# Patient Record
Sex: FEMALE | Race: WHITE | NOT HISPANIC OR LATINO | Employment: OTHER | URBAN - NONMETROPOLITAN AREA
[De-identification: names, ages, dates, MRNs, and addresses within clinical notes are randomized per-mention and may not be internally consistent; named-entity substitution may affect disease eponyms.]

---

## 2017-01-26 ENCOUNTER — RESULTS ENCOUNTER (OUTPATIENT)
Dept: ONCOLOGY | Facility: CLINIC | Age: 57
End: 2017-01-26

## 2017-01-26 ENCOUNTER — OFFICE VISIT (OUTPATIENT)
Dept: ONCOLOGY | Facility: CLINIC | Age: 57
End: 2017-01-26

## 2017-01-26 VITALS
WEIGHT: 177 LBS | BODY MASS INDEX: 28.45 KG/M2 | SYSTOLIC BLOOD PRESSURE: 169 MMHG | HEART RATE: 98 BPM | RESPIRATION RATE: 18 BRPM | DIASTOLIC BLOOD PRESSURE: 84 MMHG | HEIGHT: 66 IN | TEMPERATURE: 98.2 F

## 2017-01-26 DIAGNOSIS — T45.1X5A PERIPHERAL NEUROPATHY DUE TO CHEMOTHERAPY (HCC): Primary | ICD-10-CM

## 2017-01-26 DIAGNOSIS — R06.02 SHORTNESS OF BREATH ON EXERTION: ICD-10-CM

## 2017-01-26 DIAGNOSIS — G62.0 PERIPHERAL NEUROPATHY DUE TO CHEMOTHERAPY (HCC): ICD-10-CM

## 2017-01-26 DIAGNOSIS — D64.9 ANEMIA, UNSPECIFIED TYPE: ICD-10-CM

## 2017-01-26 DIAGNOSIS — G62.0 PERIPHERAL NEUROPATHY DUE TO CHEMOTHERAPY (HCC): Primary | ICD-10-CM

## 2017-01-26 DIAGNOSIS — T45.1X5A PERIPHERAL NEUROPATHY DUE TO CHEMOTHERAPY (HCC): ICD-10-CM

## 2017-01-26 PROCEDURE — 99214 OFFICE O/P EST MOD 30 MIN: CPT | Performed by: NURSE PRACTITIONER

## 2017-01-26 NOTE — MR AVS SNAPSHOT
Miguelina Hensley   2017 1:30 PM   Office Visit    Dept Phone:  [phone]   Encounter #:  [id number]    Provider:  FAVIOLA Red   Department:  [facility] GROUP HEMATOLOGY  AND ONCOLOGY                Your Full Care Plan              Your Updated Medication List          This list is accurate as of: 17  2:42 PM.  Always use your most recent med list.                anastrozole 1 MG tablet   Commonly known as:  ARIMIDEX       esomeprazole 20 MG capsule   Commonly known as:  nexIUM       gabapentin 300 MG capsule   Commonly known as:  NEURONTIN   Take 1 capsule by mouth 3 (Three) Times a Day. 1 nightly x 1 week, if tolerated increase to 1 twice daily x 1 week, then three times daily       ondansetron 4 MG tablet   Commonly known as:  ZOFRAN   Take 1 tablet by mouth Every 6 (Six) Hours As Needed for nausea or vomiting.       polyethylene glycol packet   Commonly known as:  MIRALAX   Take 17 g by mouth Daily.               We Performed the Following     Ambulatory Referral to Neurology     Ambulatory Referral to Pulmonology       Instructions     None    Patient Instructions History      [employer] Signup     [facility] [employer] allows you to send messages to your doctor, view your test results, renew your prescriptions, schedule appointments, and more. To sign up, go to [employer] and click on the Sign Up Now link in the New User? box. Enter your [employer] Activation Code exactly as it appears below along with the last four digits of your Social Security Number and your Date of Birth () to complete the sign-up process. If you do not sign up before the expiration date, you must request a new code.    [employer] Activation Code: [email or web address]  Expires: 2017  2:41 PM    If you have questions, you can email [email or web address] or call [phone] to talk to our [employer] staff. Remember, [employer] is NOT to be used for urgent needs. For  "medical emergencies, dial 911.               Other Info from Your Visit           Your appointments     Date & Time Provider Appointment Department    Feb 27, 2017 10:30 AM EST  (Arrive by 9:30 AM EST) Aroldo Rivera MD Outside Facility [facility] GASTROENTEROLOGY    Dec 18, 2017  1:00 PM EST FAVIOLA Brooke Pap Smear/Pelvic Exam [facility] GYNECOLOGIC ONCOLOGY        [facility] GASTROENTEROLOGY  [address]  [facility] [id number] [phone] [facility] GYNECOLOGIC ONCOLOGY  [address]  [facility] [id number] [phone]          Vital Signs     Blood Pressure Pulse Temperature Respirations Height Weight    169/84 98 98.2 °F (36.8 °C) 18 66\" (167.6 cm) 177 lb (80.3 kg)    Body Mass Index Smoking Status                28.57 kg/m2 Former Smoker          Problems and Diagnoses Noted     Anemia    Peripheral neuropathy due to chemotherapy    Shortness of breath on exertion        "

## 2017-01-26 NOTE — PROGRESS NOTES
CHIEF COMPLAINT: 1.  Shortness of breath with exertion                                       2.  Peripheral neuropathy of the hands and feet                                       3.  Follow-up for left breast cancer                                           Problem List:  Oncology/Hematology History    1. Breast cancer: Clinical stage [id number], stage IIIB, left breast cancer, invasive moderately differentiated ductal adenocarcinoma with apocrine features, Arteaga-Laurent grade II (3 + 3 + 1) estrogen receptor positive, progesterone receptor positive, HER-2/niraj negative. Presented with cutaneous involvement on 01/07/2015:   a) Staging baseline echocardiogram estimated ejection fraction 55% to 60% on  01/14/2016).   b) Staging total body bone scan revealed a small focus of increased tracer activity suggested within the left eleventh posterior rib versus artifact from tracer activity within the renal collecting system. No obvious abnormalities  identified within the left eleventh posterior rib on patient's recent CT examination. Underlying rib appears unremarkable. There no suspicious areas  otherwise to suggest metastatic disease.   c) CT of the head, chest, abdomen, and pelvis on 01/14/2016: CT of the head:  Negative. CT of the chest revealed a large mass within the left breast measuring 4.2 x 3.3 cm. The mass extends to the skin and there is mild skin thickening. Diffuse adenopathy within the left axilla. These nodes have a diameter up to 1.5 cm. There is adenopathy posterior to the left pectoralis major muscle with several nodes identified; there is a node between the left pectoralis major muscle and the pectoralis minor muscle that measures 3.0 x 1.9 cm. Several other additional nodes identified posterior to the left pectoralis major muscle. CT of the abdomen negative other than for low attenuation left adrenal gland, measuring approximately 1.2 cm likely secondary to underlying adrenal adenoma as well as within  the right adrenal gland. Also noted in the pelvis a 2.2 x 1.6 cm low attenuation structure likely felt to be ovarian follicle or cyst.   d) Baseline CBC and CMP, on 01/08/2016: WBC 9.2, hemoglobin 12.5, hematocrit  38.2%, platelets 358,000. CMP: Glucose 109, BUN 20, creatinine 0.97, sodium  140, potassium 4.4, chloride 99, CO2 of 23, calcium 9.8, protein 8.0, albumin  4.9, total bilirubin 0.5, alkaline phosphatase 94, AST 17, ALT 16.  e) BRCA 2 positive.  f) Follow-up bone scan on 06/06/2016 negative for metastatic disease. Repeat  CT of the abdomen and pelvis on 06/06/2016 revealed unchanged left adrenal  gland consistent with adenoma, 11 mm, no evidence of abdominal or pelvic  distant metastatic disease.   g) October found her dyspneic with O2sat of 83%.  on follow-up 1 week later her oxygen saturation on room air was 98% without any further interventions and she was feeling better despite no interventions.    2.  Peripheral Neuropathy          Malignant neoplasm of upper-outer quadrant of left female breast    1/7/2016 Initial Diagnosis    Malignant neoplasm of upper-outer quadrant of left female breast      1/21/2016 - 6/3/2016 Chemotherapy    DD Adriamycin and Cytoxan x 4 courses followed by Taxol weekly x 12 courses      2/2/2016 Genetic Testing    BRCA 2 Positive      6/27/2016 Surgery    Bilateral MRM's.  Right benign. Left 1.5 cm, up to 4 cm in scattered small lesions including into the deep dermis.  3 out of 13 lymph nodes positive.  Laurent 6 out of 9.  Estrogen and progesterone receptor previously positive HER-2/niraj negative.      7/7/2016 -  Hormonal Therapy    Arimidex      8/10/2016 - 9/21/2016 Radiation    Radiation OncologyTreatment Course:  Dr. Natanael Medellin      10/19/2016 Imaging    Workup of dyspnea included: CT angiogram of chest, negative, 2D ECHO normal with EF 60-65%, Pulmonary function tests with mild reduction in FVC on spirometry, suggestive of restrictive defect, otherwise normal.       11/1/2016 Surgery    Robotic-assisted total laparoscopic hysterectomy, bilateral salpingo-oophorectomy         HISTORY OF PRESENT ILLNESS:  The patient is a 56 y.o. female, here for follow up on management of history of left breast cancer.  The patient states that she continues to suffer from peripheral neuropathy and feels that it is worsening.  She states that her hands basically are numb and cramp all of the time, she has difficulty writing and holding objects.  She also reports that with the numbness in both of her feet she has difficulty walking at times and will stumble, she also states that the numbness and nerve discomfort is now moving from her feet up her legs to her knees.  She also continues to have significant shortness of breath with any exertion.  She currently has a nonproductive cough, she states that she has been getting over an upper respiratory illness.  She has been afebrile.  She also states she was recently diagnosed with a left frozen shoulder and is seeing orthopedics.  No other unusual bony aches or pains.  She has been scheduled for routine screening colonoscopy.      Past Medical History   Diagnosis Date   • Anemia    • Asthma    • BRCA2 positive    • Malignant neoplasm of upper outer quadrant of female breast      LEFT   • Menopause    • Migraines      HX   • Peripheral neuropathy      Past Surgical History   Procedure Laterality Date   • Carpal tunnel release     • Mastectomy Bilateral 2016   • Venous access device (port) insertion and removal Right    • Total laparoscopic hysterectomy N/A 11/1/2016     RATLH/BSO       Allergies   Allergen Reactions   • Ibuprofen Swelling       Family History and Social History reviewed and changed as necessary      REVIEW OF SYSTEM:   Review of Systems   Constitutional: Negative for appetite change, chills, diaphoresis, fatigue, fever and unexpected weight change.   HENT:   Negative for mouth sores, sore throat and trouble swallowing.    Eyes: Negative  "for icterus.   Respiratory: Negative for hemoptysis.    Cardiovascular: Negative for chest pain, leg swelling and palpitations.   Gastrointestinal: Negative for abdominal distention, abdominal pain, blood in stool, constipation, diarrhea, nausea and vomiting.   Endocrine: Negative for hot flashes.   Genitourinary: Negative for bladder incontinence, difficulty urinating, dysuria, frequency and hematuria.    Musculoskeletal: Negative for neck pain and neck stiffness.   Skin: Negative for rash.   Neurological: Negative for dizziness, headaches, light-headedness.   Hematological: Negative for adenopathy. Does not bruise/bleed easily.   Psychiatric/Behavioral: Negative for depression. The patient is not nervous/anxious.    All other systems reviewed and are negative except as stated above in history of present illness.       PHYSICAL EXAM    Vitals:    01/26/17 1314   BP: 169/84   Pulse: 98   Resp: 18   Temp: 98.2 °F (36.8 °C)   Weight: 177 lb (80.3 kg)   Height: 66\" (167.6 cm)     Constitutional: Appears well-developed and well-nourished. No distress.   ECOG: (1) Restricted in physically strenuous activity, ambulatory and able to do work of light nature  HENT:   Head: Normocephalic.   Mouth/Throat: Oropharynx is clear and moist.   Eyes: Conjunctivae are normal. Pupils are equal, round, and reactive. No scleral icterus.   Neck: Neck supple. No JVD present. No thyromegaly present.   Cardiovascular: Normal rate, regular rhythm and normal heart sounds.    Pulmonary/Chest: Breath sounds normal. No respiratory distress.   Abdominal: Soft. Exhibits no distension and no mass.   Musculoskeletal:Exhibits no edema or deformity.   Neurological: Alert and oriented to person, place, and time.   Skin: No ecchymosis, no petechiae and no rash noted. Not diaphoretic. No cyanosis. Nails show no clubbing.   Psychiatric: Normal mood and affect.   Vitals reviewed.    Diagnostic data: All previous office notes, outside physician notes and " laboratory data available for review in Epic were reviewed at time of visit today.    Assessment/Plan     1.  History of left breast cancer, stage IIIB, hormone receptor positive, HER-2/niraj negative: Patient continues on Arimidex without any significant side effects.  We will plan on continuing this through July 20 20/6/14 years of extended adjuvant therapy.  No evidence of recurrence on clinical exam however she still continues to suffer significantly from the side effects of her treatment.    2.  Peripheral neuropathy of the hands and feet after chemotherapy: The patient feels that this is worsening, she now states that she has difficulty walking and will stumble at times due to lack of feeling in her feet.  She also states that the nerve discomfort is moving up her calves.  She is on gabapentin 300 mg 3 times daily, I've asked her to titrate up to 600 mg 3 times daily.  I will also get her to neurology for further evaluation and recommendations.    3.  Shortness of breath with exertion: According to the patient, this does not seem to be improving but is persistent.  When I saw her in October she actually had hypoxemia in the office with an oxygen saturation down to 83% with ambulation.  Her oxygen saturation today was normal, it was 95-96% at rest and with ambulation although her heart rate did increase to 125 with ambulation.  Previous workup in October included pulmonary function test that showed some mild reduction in FVC on spirometry digestive of restrictive defect but otherwise normal, she had a negative CT angiogram, normal echocardiogram with ejection fraction of 60-65%, lab work was unremarkable.  I will refer her to pulmonology, Dr. Mishra for further evaluation.    4.  Anemia: Some of her dyspnea could be due to anemia but this seems out of proportion.  Most recent CBC after hysterectomy revealed a hemoglobin of 10, hematocrit 30.7% with MCV of 102.3.  The patient states that she has always been  somewhat anemic.  She is scheduled to undergo colonoscopy and is just waiting to get the date on that from her primary care provider.  In the meantime I will repeat her CBC today along with a CMP, we'll also check B12, folate, methylmalonic acid, ferritin and iron studies.    I will see her back in 3 months for survivorship follow-up and certainly sooner if needed.  25 minutes of today's 30 minute appointment was spent in counseling and education in regards to the above symptoms and their management.       Errors in dictation may reflect use of voice recognition software and not all errors in transcription may have been detected prior to signing.    Cherrie Stone, APRN    01/26/2017

## 2017-01-30 ENCOUNTER — DOCUMENTATION (OUTPATIENT)
Dept: ONCOLOGY | Facility: CLINIC | Age: 57
End: 2017-01-30

## 2017-02-15 ENCOUNTER — DOCUMENTATION (OUTPATIENT)
Dept: ONCOLOGY | Facility: CLINIC | Age: 57
End: 2017-02-15

## 2017-03-17 ENCOUNTER — LAB (OUTPATIENT)
Dept: LAB | Facility: HOSPITAL | Age: 57
End: 2017-03-17

## 2017-03-17 ENCOUNTER — OFFICE VISIT (OUTPATIENT)
Dept: NEUROLOGY | Facility: CLINIC | Age: 57
End: 2017-03-17

## 2017-03-17 VITALS
WEIGHT: 186 LBS | BODY MASS INDEX: 29.19 KG/M2 | HEIGHT: 67 IN | SYSTOLIC BLOOD PRESSURE: 122 MMHG | DIASTOLIC BLOOD PRESSURE: 86 MMHG

## 2017-03-17 DIAGNOSIS — G62.0 PERIPHERAL NEUROPATHY DUE TO CHEMOTHERAPY (HCC): Primary | ICD-10-CM

## 2017-03-17 DIAGNOSIS — T45.1X5A PERIPHERAL NEUROPATHY DUE TO CHEMOTHERAPY (HCC): Primary | ICD-10-CM

## 2017-03-17 DIAGNOSIS — D64.9 ANEMIA, UNSPECIFIED: Primary | ICD-10-CM

## 2017-03-17 LAB
HBA1C MFR BLD: 5.9 % (ref 4.8–5.6)
TSH SERPL DL<=0.05 MIU/L-ACNC: 24.16 MIU/ML (ref 0.35–5.35)
VIT B12 BLD-MCNC: 405 PG/ML (ref 211–911)

## 2017-03-17 PROCEDURE — 86334 IMMUNOFIX E-PHORESIS SERUM: CPT | Performed by: PSYCHIATRY & NEUROLOGY

## 2017-03-17 PROCEDURE — 83036 HEMOGLOBIN GLYCOSYLATED A1C: CPT | Performed by: PSYCHIATRY & NEUROLOGY

## 2017-03-17 PROCEDURE — 99244 OFF/OP CNSLTJ NEW/EST MOD 40: CPT | Performed by: PSYCHIATRY & NEUROLOGY

## 2017-03-17 PROCEDURE — 86038 ANTINUCLEAR ANTIBODIES: CPT | Performed by: PSYCHIATRY & NEUROLOGY

## 2017-03-17 PROCEDURE — 82607 VITAMIN B-12: CPT | Performed by: PSYCHIATRY & NEUROLOGY

## 2017-03-17 PROCEDURE — 36415 COLL VENOUS BLD VENIPUNCTURE: CPT | Performed by: PSYCHIATRY & NEUROLOGY

## 2017-03-17 PROCEDURE — 84443 ASSAY THYROID STIM HORMONE: CPT | Performed by: PSYCHIATRY & NEUROLOGY

## 2017-03-17 RX ORDER — BENZONATATE 200 MG/1
CAPSULE ORAL
COMMUNITY
Start: 2017-01-17 | End: 2017-10-13

## 2017-03-17 RX ORDER — PREDNISONE 50 MG/1
TABLET ORAL
COMMUNITY
Start: 2017-01-17 | End: 2017-10-13

## 2017-03-17 RX ORDER — FLUTICASONE PROPIONATE AND SALMETEROL XINAFOATE 115; 21 UG/1; UG/1
AEROSOL, METERED RESPIRATORY (INHALATION)
COMMUNITY
Start: 2017-03-10 | End: 2017-10-13

## 2017-03-17 RX ORDER — TIOTROPIUM BROMIDE INHALATION SPRAY 3.12 UG/1
SPRAY, METERED RESPIRATORY (INHALATION)
COMMUNITY
Start: 2017-03-09

## 2017-03-17 RX ORDER — AZITHROMYCIN 250 MG/1
TABLET, FILM COATED ORAL
COMMUNITY
Start: 2017-01-17 | End: 2017-10-13

## 2017-03-17 RX ORDER — GABAPENTIN 800 MG/1
TABLET ORAL
Qty: 90 TABLET | Refills: 3 | Status: SHIPPED | OUTPATIENT
Start: 2017-03-17 | End: 2017-10-13

## 2017-03-17 NOTE — PROGRESS NOTES
"Subjective   Miguelina Hensley is a 56 y.o. female.     History of Present Illness     The following portions of the patient's history were reviewed today and updated as appropriate:  allergies, current medications, past family history, past medical history, past social history, past surgical history and problem list.      Chief complaint is pain, numbness and the patient is seen today in consultation at the request of the referring health care provider  The time I spent with the patient today was used discussing the differential diagnosis, treatment and management options and prognosis. I addressed all of the patient's questions.  The patient has had after chemotherapy from Taxol nerve damage in both feet and also partially in her hands. However that problem has progressed now that she is stumbling and she also has more cramping in her feet and significant pain. Because of the worsening of her problem she is now seen here.    Review of Systems   Constitutional: Negative for appetite change, chills and fatigue.   HENT: Negative for congestion, ear pain, facial swelling and sinus pressure.    Eyes: Negative for pain and redness.   Respiratory: Negative for shortness of breath.    Cardiovascular: Negative for chest pain.   Gastrointestinal: Negative for abdominal pain.   Endocrine: Negative for cold intolerance and heat intolerance.   Genitourinary: Negative for dysuria.   Musculoskeletal: Negative for arthralgias.   Skin: Negative for rash.   Allergic/Immunologic: Negative for immunocompromised state.   Neurological: Positive for numbness.   Hematological: Negative for adenopathy.   Psychiatric/Behavioral: Negative for hallucinations.         Objective      height is 67\" (170.2 cm) and weight is 186 lb (84.4 kg). Her blood pressure is 122/86.     The patient's general appearance was normal today  Carotid pulses were palpable bilaterally  The ophthalmological exam showed the refractory media clear, no blurring of disc " margins, there were no hemorrhages noted, blood vessels appeared normal.      Neurologic Exam     Mental Status   Oriented to person.   Oriented to place. Oriented to country, city, area and street.   Oriented to time. Oriented to year, month, date, day and season.   Registration: recalls 3 of 3 objects. Recall at 5 minutes: recalls 3 of 3 objects. Follows 3 step commands.   Attention: normal.   Speech: speech is normal   Level of consciousness: alert  Knowledge: consistent with education.   Able to name object. Able to read. Able to repeat. Able to write. Normal comprehension.     Cranial Nerves     CN II   Visual fields full to confrontation.     CN III, IV, VI   Right pupil: Shape: regular. Consensual response: intact. Accommodation: intact.   Left pupil: Shape: regular. Consensual response: intact.   CN III: no CN III palsy  CN VI: no CN VI palsy  Nystagmus: none   Diplopia: none  Ophthalmoparesis: none  Upgaze: normal  Downgaze: normal  Conjugate gaze: present  Vestibulo-ocular reflex: present    CN V   Facial sensation intact.     CN VII   Facial expression full, symmetric.     CN VIII   CN VIII normal.     CN IX, X   CN IX normal.     CN XI   CN XI normal.     CN XII   CN XII normal.     Motor Exam   Muscle bulk: normal  Overall muscle tone: normal  Right arm pronator drift: absent  Left arm pronator drift: absent    Strength   Strength 5/5 except as noted.     Sensory Exam   Light touch normal.   Right leg vibration: decreased from toes  Left leg vibration: decreased from toes    Gait, Coordination, and Reflexes     Gait  Gait: normal    Coordination   Romberg: negative  Finger to nose coordination: normal  Heel to shin coordination: normal  Tandem walking coordination: normal    Tremor   Resting tremor: absent  Intention tremor: absent  Action tremor: absent    Reflexes   Reflexes 2+ except as noted.   Right achilles: 0  Left achilles: 0      Assessment/Plan     Miguelina was seen today for peripheral  "neuropathy.    Diagnoses and all orders for this visit:    Peripheral neuropathy due to chemotherapy  -     Hemoglobin A1c  -     TSH  -     Vitamin B12  -     Immunofixation Serum  -     NYA With / DsDNA, RNP, Sjogrens A / B, Smith  -     EMG & Nerve Conduction Test    Other orders  -     gabapentin (NEURONTIN) 800 MG tablet; 1 to 1 1/2 tab po TID          Discussion/Summary:        Will have to have some additional blood work to see if she has other additional causes in addition to the chemotherapy. She will have to have EMG nerve conduction studies in the meantime increase her gabapentin dose. I will discuss further management options with her when I see her back            Disclaimer: This letter was created using dragon voice recognition software.  This voice recognition software may create errors that may go undetected and can impact the meaning of a sentence or paragraph.  The most frequent error is that the software misidentifies \"he\" and \"she\". However, contextual reading is often able to identify this as a voice recognotion error.  Another frequent error is that the software misidentifies \"the patient\" as \"\"          "

## 2017-03-20 LAB
ANA SER QL: NEGATIVE
IGA SERPL-MCNC: 158 MG/DL (ref 87–352)
IGG SERPL-MCNC: 1053 MG/DL (ref 700–1600)
IGM SERPL-MCNC: 128 MG/DL (ref 26–217)
PROT PATTERN SERPL IFE-IMP: NORMAL

## 2017-04-13 ENCOUNTER — CLINICAL SUPPORT (OUTPATIENT)
Dept: ONCOLOGY | Facility: CLINIC | Age: 57
End: 2017-04-13

## 2017-04-13 VITALS
WEIGHT: 190 LBS | TEMPERATURE: 98.8 F | BODY MASS INDEX: 29.82 KG/M2 | HEART RATE: 100 BPM | DIASTOLIC BLOOD PRESSURE: 74 MMHG | HEIGHT: 67 IN | RESPIRATION RATE: 16 BRPM | SYSTOLIC BLOOD PRESSURE: 105 MMHG

## 2017-04-13 DIAGNOSIS — Z15.01 BRCA2 POSITIVE: Primary | ICD-10-CM

## 2017-04-13 DIAGNOSIS — Z85.3 HISTORY OF LEFT BREAST CANCER: ICD-10-CM

## 2017-04-13 DIAGNOSIS — Z15.09 BRCA2 POSITIVE: Primary | ICD-10-CM

## 2017-04-13 PROCEDURE — 99214 OFFICE O/P EST MOD 30 MIN: CPT | Performed by: NURSE PRACTITIONER

## 2017-04-13 NOTE — PROGRESS NOTES
CHIEF COMPLAINT:   1.  Peripheral Neuropathy                                         2.  Follow up for history of breast cancer    Problem List:  Oncology/Hematology History    1. Breast cancer: Clinical stage [id number], stage IIIB, left breast cancer, invasive moderately differentiated ductal adenocarcinoma with apocrine features, Arteaga-Laurent grade II (3 + 3 + 1) estrogen receptor positive, progesterone receptor positive, HER-2/niraj negative. Presented with cutaneous involvement on 01/07/2015:   a) Staging baseline echocardiogram estimated ejection fraction 55% to 60% on  01/14/2016).   b) Staging total body bone scan revealed a small focus of increased tracer activity suggested within the left eleventh posterior rib versus artifact from tracer activity within the renal collecting system. No obvious abnormalities  identified within the left eleventh posterior rib on patient's recent CT examination. Underlying rib appears unremarkable. There no suspicious areas  otherwise to suggest metastatic disease.   c) CT of the head, chest, abdomen, and pelvis on 01/14/2016: CT of the head:  Negative. CT of the chest revealed a large mass within the left breast measuring 4.2 x 3.3 cm. The mass extends to the skin and there is mild skin thickening. Diffuse adenopathy within the left axilla. These nodes have a diameter up to 1.5 cm. There is adenopathy posterior to the left pectoralis major muscle with several nodes identified; there is a node between the left pectoralis major muscle and the pectoralis minor muscle that measures 3.0 x 1.9 cm. Several other additional nodes identified posterior to the left pectoralis major muscle. CT of the abdomen negative other than for low attenuation left adrenal gland, measuring approximately 1.2 cm likely secondary to underlying adrenal adenoma as well as within the right adrenal gland. Also noted in the pelvis a 2.2 x 1.6 cm low attenuation structure likely felt to be ovarian follicle or  cyst.   d) Baseline CBC and CMP, on 01/08/2016: WBC 9.2, hemoglobin 12.5, hematocrit  38.2%, platelets 358,000. CMP: Glucose 109, BUN 20, creatinine 0.97, sodium  140, potassium 4.4, chloride 99, CO2 of 23, calcium 9.8, protein 8.0, albumin  4.9, total bilirubin 0.5, alkaline phosphatase 94, AST 17, ALT 16.  e) BRCA 2 positive.  f) Follow-up bone scan on 06/06/2016 negative for metastatic disease. Repeat  CT of the abdomen and pelvis on 06/06/2016 revealed unchanged left adrenal  gland consistent with adenoma, 11 mm, no evidence of abdominal or pelvic  distant metastatic disease.   g) October found her dyspneic with O2sat of 83%.  on follow-up 1 week later her oxygen saturation on room air was 98% without any further interventions and she was feeling better despite no interventions.    2.  Peripheral Neuropathy          Malignant neoplasm of upper-outer quadrant of left female breast    1/7/2016 Initial Diagnosis    Malignant neoplasm of upper-outer quadrant of left female breast      1/21/2016 - 6/3/2016 Chemotherapy    DD Adriamycin and Cytoxan x 4 courses followed by Taxol weekly x 12 courses      2/2/2016 Genetic Testing    BRCA 2 Positive      6/27/2016 Surgery    Bilateral MRM's.  Right benign. Left 1.5 cm, up to 4 cm in scattered small lesions including into the deep dermis.  3 out of 13 lymph nodes positive.  Laurent 6 out of 9.  Estrogen and progesterone receptor previously positive HER-2/niraj negative.      7/7/2016 -  Hormonal Therapy    Arimidex      8/10/2016 - 9/21/2016 Radiation    Radiation OncologyTreatment Course:  Dr. Natanael Medellin      10/19/2016 Imaging    Workup of dyspnea included: CT angiogram of chest, negative, 2D ECHO normal with EF 60-65%, Pulmonary function tests with mild reduction in FVC on spirometry, suggestive of restrictive defect, otherwise normal.      11/1/2016 Surgery    Robotic-assisted total laparoscopic hysterectomy, bilateral salpingo-oophorectomy         HISTORY OF  PRESENT ILLNESS:  The patient is a 56 y.o. female, here for follow up on management of history of left breast cancer.  The patient continues to have difficulty with her peripheral neuropathy in her hands and feet.  It is not worsening but is persistent in nature and quite bothersome to her.  She is seeing neurology.  She did have lab work done and states she received a letter from Dr. Rod to let her know that her thyroid function test was abnormal along with her hemoglobin A1c.  She also continues to have some shortness of breath with exertion, she is following with Dr. Mishra and states that this may be somewhat improved on some of her new medication.  She has had no unusual cough or hemoptysis.  She reports some mild hoarseness that she thinks is due to some of the medication.  No new or concerning bony aches or pains, no new or concerning findings on chest wall exam.  Continues to tolerate Arimidex without any unusual side effects.      Past Medical History:   Diagnosis Date   • Anemia    • Asthma    • BRCA2 positive    • Malignant neoplasm of upper outer quadrant of female breast     LEFT   • Menopause    • Migraines     HX   • Peripheral neuropathy      Past Surgical History:   Procedure Laterality Date   • CARPAL TUNNEL RELEASE     • MASTECTOMY Bilateral 2016   • TOTAL LAPAROSCOPIC HYSTERECTOMY N/A 11/1/2016    RATLH/BSO   • VENOUS ACCESS DEVICE (PORT) INSERTION AND REMOVAL Right        Allergies   Allergen Reactions   • Ibuprofen Swelling       Family History and Social History reviewed and changed as necessary      REVIEW OF SYSTEM:   Review of Systems   Constitutional: Negative for appetite change, chills, diaphoresis, fever and unexpected weight change.   HENT:   Negative for mouth sores, sore throat and trouble swallowing.    Eyes: Negative for icterus.   Respiratory: Negative for cough, hemoptysis.    Cardiovascular: Negative for chest pain, leg swelling and palpitations.   Gastrointestinal:  "Negative for abdominal distention, abdominal pain, blood in stool, constipation, diarrhea, nausea and vomiting.   Endocrine: Negative for hot flashes.   Genitourinary: Negative for bladder incontinence, difficulty urinating, dysuria, frequency and hematuria.    Musculoskeletal: Negative for gait problem, neck pain and neck stiffness.   Skin: Negative for rash.   Neurological: Negative for dizziness, headaches, light-headedness.   Hematological: Negative for adenopathy. Does not bruise/bleed easily.   Psychiatric/Behavioral: Negative for depression. The patient is not nervous/anxious.    All other systems reviewed and are negative except as stated above in the history of present illness.       PHYSICAL EXAM    Vitals:    04/13/17 1324   BP: 105/74   Pulse: 100   Resp: 16   Temp: 98.8 °F (37.1 °C)   Weight: 190 lb (86.2 kg)   Height: 67\" (170.2 cm)     Constitutional: Appears well-developed and well-nourished. No distress.   ECOG: (1) Restricted in physically strenuous activity, ambulatory and able to do work of light nature  HENT:   Head: Normocephalic.   Mouth/Throat: Oropharynx is clear and moist.   Eyes: Conjunctivae are normal. Pupils are equal, round, and reactive. No scleral icterus.   Neck: Neck supple. No JVD present. No thyromegaly present.   Cardiovascular: Normal rate, regular rhythm and normal heart sounds.  Chest: Chest wall exam status post bilateral mastectomies is benign, skin tissue is soft, there are no abnormal masses, nodules or lesions.   Nodes: No cervical, supraclavicular, axillary or inguinal nodes palpable on exam.    Pulmonary/Chest: Breath sounds normal. No respiratory distress.   Abdominal: Soft. Exhibits no distension.   Musculoskeletal:Exhibits no edema, tenderness or deformity.   Neurological: Alert and oriented to person, place, and time. Exhibits normal muscle tone.   Skin: No ecchymosis, no petechiae and no rash noted. Not diaphoretic. No cyanosis. Nails show no clubbing. "   Psychiatric: Normal mood and affect.   Vitals reviewed.    Diagnostic data: All previous office notes, outside physician notes including notes from pulmonology and neurology along with laboratory data were reviewed at time of visit.  Patient's survivorship care plan was also reviewed with her at time of visit.    Assessment/Plan     1.  History of left breast cancer, stage IIIB, hormone receptor positive, HER-2/niraj negative: The patient continues to be without any evidence of disease on clinical exam and has no new worrisome symptoms.  She continues on Arimidex of which we plan on 10 years with extended adjuvant therapy through July 2026.  We will plan to see her in 6 months for follow-up and chest wall exam.  At that time, if she has not had bone density testing we will arrange for that.  I did review her survivorship care plan with her at today's appointment, all questions were answered to her satisfaction and she was provided a copy of the report.    2.  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 Rod and will continue to follow with him.  Recent lab work from his office revealed an elevated TSH and a slightly elevated hemoglobin A1c.  I have asked Miguelina to follow-up with Dr. Gonzalez in regards to these findings, she states understanding.    3.  Shortness of breath with exertion: She has seen Dr. Mishra and will continue to follow with him.  She states this may be slightly better with some new medication she is now taking.    20 minutes of today's 25 minute appointment was spent in counseling and education in regards to the above.   Errors in dictation may reflect use of voice recognition software and not all errors in transcription may have been detected prior to signing.    Cherrie Stone, APRN    04/13/2017

## 2017-04-27 ENCOUNTER — OFFICE VISIT (OUTPATIENT)
Dept: NEUROLOGY | Facility: CLINIC | Age: 57
End: 2017-04-27

## 2017-04-27 DIAGNOSIS — R20.0 NUMBNESS AND TINGLING: Primary | ICD-10-CM

## 2017-04-27 DIAGNOSIS — R20.2 NUMBNESS AND TINGLING: Primary | ICD-10-CM

## 2017-04-27 PROCEDURE — 95886 MUSC TEST DONE W/N TEST COMP: CPT | Performed by: PSYCHIATRY & NEUROLOGY

## 2017-04-27 PROCEDURE — 95911 NRV CNDJ TEST 9-10 STUDIES: CPT | Performed by: PSYCHIATRY & NEUROLOGY

## 2017-04-27 NOTE — PROGRESS NOTES
"Procedure   EMG & Nerve Conduction Test  Date/Time: 4/27/2017 9:31 AM  Performed by: VENKATESH BOSCH  Authorized by: VENKATESH BOSCH   Consent: Verbal consent obtained.            [facility]   [address], [address]  [place]  [address]   Phone: [phone]  FAX: [phone]           Patient: catherine cabrales YOB: 1960  Gender: Female  Reason for study: see below in history section      Visit Date: 4/27/2017 09:36  Age: 56 Years 11 Months Old  Examining Physician: Pablito       The patient has a chief complaint and history of bilateral, upper extremity, lower extremity, numbness,    The patient is referred to have this problem evaluated today with EMG and nerve conduction studies.  The performing physician also acted as a technician on this study.  Please note that in the table \"NR\" stands for \"no response\" therefore testing was performed, but no response was elicited.    A brief neurological exam, revealed no abnormalities in muscle bulk strength reflexes and sensationbilateral, upper extremity, lower extremity, numbness,      Sensory NCS      Nerve / Sites Rec. Site Distance Onset Lat NP Amp Onset Mike     cm ms µV m/s   L Sural - Ankle (Calf)      Calf Ankle 14 4.8 0.5 29   R Sural - Ankle (Calf)      Calf Ankle 14 4.6 0.8 30   L Superficial peroneal - Ankle      Lat leg Ankle 8 4.3 0.3 19       Motor NCS      Nerve / Sites Muscle Distance Latency Amplitude Velocity     cm ms mV m/s   L Peroneal - EDB      Ankle EDB 8 5.47 2.7       Fib head EDB 30 13.02 2.7 40   R Peroneal - EDB      Ankle EDB 8 4.84 7.7       Fib head EDB 30 12.34 8.0 40   L Tibial - AH      Ankle AH 8 6.82 7.4       Pop fossa AH 43 17.55 7.7 40   R Tibial - AH      Ankle AH 8 6.15 9.7       Pop fossa AH 43 14.43 9.7 52       F  Wave      Nerve F-min    ms   L Tibial - AH 47   L Peroneal - EDB 49   R Peroneal - EDB 52   R Tibial - AH 55       H Reflex      Nerve H Lat    ms   L Tibial - Soleus " 42.9   R Tibial - Soleus 48.6       EMG Summary Table     Spontaneous MUAP Recruitment    IA Fib PSW Fasc H.F. Amp Dur. PPP Pattern   L. GASTROCN (MED) N None None None None N N N N   L. TIB ANTERIOR N None None None None N N N N   L. ILIOPSOAS N None None None None N N N N   L. QUADRICEPS N None None None None N N N N   L. GLUTEUS MAX N None None None None N N N N   L. L.PARASPINALS N None None None None N N N N   R. L.PARASPINALS N None None None None N N N N   R. GASTROCN (MED) N None None None None N N N N   R. TIB ANTERIOR N None None None None N N N N   R. ILIOPSOAS N None None None None N N N N   R. QUADRICEPS N None None None None N N N N   R. GLUTEUS MAX N None None None None N N N N   1.) SNAPs were mildly slow.  2.) CMAPs were normal  3.) F-waves were of normal minimal latency where elicitable.  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    All NCS were performed at 32C or greater.        Matheus Kenney M.D.                       Diagnoses and all orders for this visit:    Numbness and tingling  -     EMG & Nerve Conduction Test

## 2017-06-23 ENCOUNTER — DOCUMENTATION (OUTPATIENT)
Dept: ONCOLOGY | Facility: CLINIC | Age: 57
End: 2017-06-23

## 2017-06-23 NOTE — PROGRESS NOTES
6/23/2017  Rec'd unum letter requesting records from 1/27/2017 to present.  SANCHO-2/28/2017  LOV-1/26/2017  Signed and dated form and put LOV.  Faxed to #[phone]

## 2017-07-28 ENCOUNTER — TELEPHONE (OUTPATIENT)
Dept: NEUROLOGY | Facility: CLINIC | Age: 57
End: 2017-07-28

## 2017-07-28 NOTE — TELEPHONE ENCOUNTER
----- Message from Neha Orozco sent at 7/28/2017 10:56 AM EDT -----  Contact: ROBERT BOSCH    ROBERT CALLED AND SAID THAT HER INSURANCE WILL NOT COVER HER GABAPENTIN AND IT'S FAR TOO EXPENSIVE NOW.  SHE WONDERED IF THERE WAS A DIFFERENT MEDICATION, THAT WAS CHEAPER, THAT SHE COULD TRY?    PLEASE CALL ROBERT BACK  [phone]  ______________________________________________________    I LVM. Advised pt to contact her [employer] company. This is not something I would know. I provided my name and CB number.   -TMT 7/28/17

## 2017-08-11 RX ORDER — ANASTROZOLE 1 MG/1
TABLET ORAL
Qty: 30 TABLET | Refills: 11 | Status: SHIPPED | OUTPATIENT
Start: 2017-08-11 | End: 2017-08-18 | Stop reason: SDUPTHER

## 2017-08-18 RX ORDER — ANASTROZOLE 1 MG/1
1 TABLET ORAL DAILY
Qty: 90 TABLET | Refills: 3 | Status: SHIPPED | OUTPATIENT
Start: 2017-08-18 | End: 2018-09-11 | Stop reason: SDUPTHER

## 2017-10-13 ENCOUNTER — OFFICE VISIT (OUTPATIENT)
Dept: ONCOLOGY | Facility: CLINIC | Age: 57
End: 2017-10-13

## 2017-10-13 VITALS
RESPIRATION RATE: 16 BRPM | TEMPERATURE: 97.8 F | SYSTOLIC BLOOD PRESSURE: 136 MMHG | BODY MASS INDEX: 30.61 KG/M2 | HEIGHT: 67 IN | HEART RATE: 86 BPM | DIASTOLIC BLOOD PRESSURE: 73 MMHG | WEIGHT: 195 LBS

## 2017-10-13 DIAGNOSIS — G62.0 PERIPHERAL NEUROPATHY DUE TO CHEMOTHERAPY (HCC): Primary | ICD-10-CM

## 2017-10-13 DIAGNOSIS — T45.1X5A PERIPHERAL NEUROPATHY DUE TO CHEMOTHERAPY (HCC): Primary | ICD-10-CM

## 2017-10-13 DIAGNOSIS — Z85.3 HISTORY OF LEFT BREAST CANCER: ICD-10-CM

## 2017-10-13 PROCEDURE — 99214 OFFICE O/P EST MOD 30 MIN: CPT | Performed by: NURSE PRACTITIONER

## 2017-10-13 RX ORDER — LEVOTHYROXINE SODIUM 0.12 MG/1
TABLET ORAL
COMMUNITY
Start: 2017-09-13

## 2017-10-13 RX ORDER — GABAPENTIN 400 MG/1
800 CAPSULE ORAL 3 TIMES DAILY
Qty: 180 CAPSULE | Refills: 0 | Status: SHIPPED | OUTPATIENT
Start: 2017-10-13

## 2017-10-13 RX ORDER — OMEPRAZOLE 40 MG/1
CAPSULE, DELAYED RELEASE ORAL
COMMUNITY
Start: 2017-09-20

## 2017-10-13 NOTE — PROGRESS NOTES
CHIEF COMPLAINT:   1.  Peripheral Neuropathy                                         2.  Follow up for history of breast cancer    Problem List:  Oncology/Hematology History    1. Breast cancer: Clinical stage [id number], stage IIIB, left breast cancer, invasive moderately differentiated ductal adenocarcinoma with apocrine features, Arteaga-Laurent grade II (3 + 3 + 1) estrogen receptor positive, progesterone receptor positive, HER-2/niraj negative. Presented with cutaneous involvement on 01/07/2015:   a) Staging baseline echocardiogram estimated ejection fraction 55% to 60% on  01/14/2016).   b) Staging total body bone scan revealed a small focus of increased tracer activity suggested within the left eleventh posterior rib versus artifact from tracer activity within the renal collecting system. No obvious abnormalities  identified within the left eleventh posterior rib on patient's recent CT examination. Underlying rib appears unremarkable. There no suspicious areas  otherwise to suggest metastatic disease.   c) CT of the head, chest, abdomen, and pelvis on 01/14/2016: CT of the head:  Negative. CT of the chest revealed a large mass within the left breast measuring 4.2 x 3.3 cm. The mass extends to the skin and there is mild skin thickening. Diffuse adenopathy within the left axilla. These nodes have a diameter up to 1.5 cm. There is adenopathy posterior to the left pectoralis major muscle with several nodes identified; there is a node between the left pectoralis major muscle and the pectoralis minor muscle that measures 3.0 x 1.9 cm. Several other additional nodes identified posterior to the left pectoralis major muscle. CT of the abdomen negative other than for low attenuation left adrenal gland, measuring approximately 1.2 cm likely secondary to underlying adrenal adenoma as well as within the right adrenal gland. Also noted in the pelvis a 2.2 x 1.6 cm low attenuation structure likely felt to be ovarian follicle or  cyst.   d) Baseline CBC and CMP, on 01/08/2016: WBC 9.2, hemoglobin 12.5, hematocrit  38.2%, platelets 358,000. CMP: Glucose 109, BUN 20, creatinine 0.97, sodium  140, potassium 4.4, chloride 99, CO2 of 23, calcium 9.8, protein 8.0, albumin  4.9, total bilirubin 0.5, alkaline phosphatase 94, AST 17, ALT 16.  e) BRCA 2 positive.  f) Follow-up bone scan on 06/06/2016 negative for metastatic disease. Repeat  CT of the abdomen and pelvis on 06/06/2016 revealed unchanged left adrenal  gland consistent with adenoma, 11 mm, no evidence of abdominal or pelvic  distant metastatic disease.   g) October found her dyspneic with O2sat of 83%.  on follow-up 1 week later her oxygen saturation on room air was 98% without any further interventions and she was feeling better despite no interventions.    2.  Peripheral Neuropathy          Malignant neoplasm of upper-outer quadrant of left female breast    1/7/2016 Initial Diagnosis     Malignant neoplasm of upper-outer quadrant of left female breast         1/21/2016 - 6/3/2016 Chemotherapy     DD Adriamycin and Cytoxan x 4 courses followed by Taxol weekly x 12 courses         2/2/2016 Genetic Testing     BRCA 2 Positive         6/27/2016 Surgery     Bilateral MRM's.  Right benign. Left 1.5 cm, up to 4 cm in scattered small lesions including into the deep dermis.  3 out of 13 lymph nodes positive.  Laurent 6 out of 9.  Estrogen and progesterone receptor previously positive HER-2/niraj negative.         7/7/2016 -  Hormonal Therapy     Arimidex         8/10/2016 - 9/21/2016 Radiation     Radiation OncologyTreatment Course:  Dr. Natanael Medellin         10/19/2016 Imaging     Workup of dyspnea included: CT angiogram of chest, negative, 2D ECHO normal with EF 60-65%, Pulmonary function tests with mild reduction in FVC on spirometry, suggestive of restrictive defect, otherwise normal.         11/1/2016 Surgery     Robotic-assisted total laparoscopic hysterectomy, bilateral  "salpingo-oophorectomy            HISTORY OF PRESENT ILLNESS:  The patient is a 57 y.o. female, here for follow up on management of history of left breast cancer.  The patient continues to have difficulty with her peripheral neuropathy in her hands and feet.  It is not worsening but is persistent in nature and quite bothersome to her.  She has seen neurology and states they told her that basically \"it won't get any better\".  Her Neurontin was increased to 800 mg 3 times a day however she cannot afford the 800 mg tablets.  She is following with Dr. Mishra in regards to her shortness of breath and states that it has improved on medications but she cannot afford the Spiriva.  No new or concerning bony aches or pains, no new or concerning findings on chest wall exam.  Continues to tolerate Arimidex without any unusual side effects.  Has seen both Dr. Bryan and Dr. Luis in regards to her decreased range of motion of her left shoulder but states she is not a surgical candidate.      Past Medical History:   Diagnosis Date   • Anemia    • Asthma    • BRCA2 positive    • Malignant neoplasm of upper outer quadrant of female breast     LEFT   • Menopause    • Migraines     HX   • Peripheral neuropathy      Past Surgical History:   Procedure Laterality Date   • CARPAL TUNNEL RELEASE     • MASTECTOMY Bilateral 2016   • TOTAL LAPAROSCOPIC HYSTERECTOMY N/A 11/1/2016    RATLH/BSO   • VENOUS ACCESS DEVICE (PORT) INSERTION AND REMOVAL Right        Allergies   Allergen Reactions   • Ibuprofen Swelling       Family History and Social History reviewed and changed as necessary      REVIEW OF SYSTEM:   Review of Systems   Constitutional: Negative for appetite change, chills, diaphoresis, fever and unexpected weight change.   HENT:   Negative for mouth sores, sore throat and trouble swallowing.    Eyes: Negative for icterus.   Respiratory: Negative for cough, hemoptysis.    Cardiovascular: Negative for chest pain, leg swelling and " "palpitations.   Gastrointestinal: Negative for abdominal distention, abdominal pain, blood in stool, constipation, diarrhea, nausea and vomiting.   Endocrine: Negative for hot flashes.   Genitourinary: Negative for bladder incontinence, difficulty urinating, dysuria, frequency and hematuria.    Musculoskeletal: Negative for gait problem, neck pain and neck stiffness.   Skin: Negative for rash.   Neurological: Negative for dizziness, headaches, light-headedness.   Hematological: Negative for adenopathy. Does not bruise/bleed easily.   Psychiatric/Behavioral: Negative for depression. The patient is not nervous/anxious.    All other systems reviewed and are negative except as stated above in the history of present illness.       PHYSICAL EXAM    Vitals:    10/13/17 1123   BP: 136/73   Pulse: 86   Resp: 16   Temp: 97.8 °F (36.6 °C)   Weight: 195 lb (88.5 kg)   Height: 67\" (170.2 cm)     Constitutional: Appears well-developed and well-nourished. No distress.   ECOG: (1) Restricted in physically strenuous activity, ambulatory and able to do work of light nature  HENT:   Head: Normocephalic.   Mouth/Throat: Oropharynx is clear and moist.   Eyes: Conjunctivae are normal. Pupils are equal, round, and reactive. No scleral icterus.   Neck: Neck supple. No JVD present. No thyromegaly present.   Cardiovascular: Normal rate, regular rhythm and normal heart sounds.  Chest: Chest wall exam status post bilateral mastectomies is benign, skin tissue is soft, there are no abnormal masses, nodules or lesions.   Nodes: No cervical, supraclavicular, axillary or inguinal nodes palpable on exam.    Pulmonary/Chest: Breath sounds normal. No respiratory distress.   Abdominal: Soft. Exhibits no distension.   Musculoskeletal:Exhibits no edema, tenderness or deformity. Decreased rom left shoulder.  Neurological: Alert and oriented to person, place, and time. Exhibits normal muscle tone.   Skin: No ecchymosis, no petechiae and no rash noted. Not " diaphoretic. No cyanosis. Nails show no clubbing.   Psychiatric: Normal mood and affect.   Vitals reviewed.    Diagnostic data: All previous office notes, outside physician notes including notes from pulmonology, surgery and neurology.  Assessment/Plan     1.  History of left breast cancer, stage IIIB, hormone receptor positive, HER-2/niraj negative: The patient continues to be without any evidence of disease on clinical exam and has no new worrisome symptoms.  She continues on Arimidex of which we plan on 10 years with extended adjuvant therapy through July 2026.  We will plan to see her in 6 months for follow-up and chest wall exam.  At that time, if she has not had bone density testing we will arrange for that.      2.  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 Rod but is going to follow up with her PCP going forward as it is difficult to make it to multiple physician visits.  She had been placed on gabapentin 800 mg 3 times a day however has not been able to afford refill.  I checked the price using GoodRx card and it seemed to be less expensive getting the 400mg capsules rather than the 800mg tablet.  I have given her a prescription for gabapentin 400 mg 2 capsules 3 times a day, quantity 180 with 0 refills.  She will get future refills from her primary care provider.  She states that she could not really tell much difference from the previous dose she was taking of gabapentin compared to the 800 mg dose.  She may try doing only 400 mg 3 times a day and if this is effective she will stay at that dose.    3.  Shortness of breath with exertion: She has seen Dr. Mishra and will continue to follow with him.  She states this has gotten much better with some new medication she is now taking but the Spiriva is too expensive.  I searched on the Internet and there is a patient with a assistance card from the [facility] pharmacy and I printed this out for her.  It looks like she can get 12  months worth with 0 co-pay.  She is going to take this to her pharmacy to see if it works.  Otherwise she will get in touch with Dr. Mishra for alternatives if she is unable to get this.    20 minutes of today's 25 minute appointment was spent in counseling and education in regards to the above and coordination of care and assisting with pharmaceuticals.      Cherrie Stone, APRN    10/13/2017

## 2018-04-13 ENCOUNTER — OFFICE VISIT (OUTPATIENT)
Dept: ONCOLOGY | Facility: CLINIC | Age: 58
End: 2018-04-13

## 2018-04-13 VITALS
BODY MASS INDEX: 31.71 KG/M2 | RESPIRATION RATE: 18 BRPM | HEART RATE: 92 BPM | SYSTOLIC BLOOD PRESSURE: 130 MMHG | WEIGHT: 202 LBS | TEMPERATURE: 98.3 F | DIASTOLIC BLOOD PRESSURE: 74 MMHG | HEIGHT: 67 IN

## 2018-04-13 DIAGNOSIS — Z78.0 POST-MENOPAUSAL: ICD-10-CM

## 2018-04-13 DIAGNOSIS — Z13.820 SCREENING FOR OSTEOPOROSIS: ICD-10-CM

## 2018-04-13 DIAGNOSIS — Z85.3 HISTORY OF LEFT BREAST CANCER: Primary | ICD-10-CM

## 2018-04-13 PROCEDURE — 99213 OFFICE O/P EST LOW 20 MIN: CPT | Performed by: NURSE PRACTITIONER

## 2018-04-13 RX ORDER — CYCLOBENZAPRINE HCL 10 MG
TABLET ORAL
COMMUNITY
Start: 2018-01-26

## 2018-04-13 NOTE — PROGRESS NOTES
CHIEF COMPLAINT:   1.  Peripheral Neuropathy                                         2. Follow up for history of breast cancer    Problem List:  Oncology/Hematology History    1. Breast cancer: Clinical stage [id number], stage IIIB, left breast cancer, invasive moderately differentiated ductal adenocarcinoma with apocrine features, Arteaga-Laurent grade II (3 + 3 + 1) estrogen receptor positive, progesterone receptor positive, HER-2/niraj negative. Presented with cutaneous involvement on 01/07/2015:   a) Staging baseline echocardiogram estimated ejection fraction 55% to 60% on  01/14/2016).   b) Staging total body bone scan revealed a small focus of increased tracer activity suggested within the left eleventh posterior rib versus artifact from tracer activity within the renal collecting system. No obvious abnormalities  identified within the left eleventh posterior rib on patient's recent CT examination. Underlying rib appears unremarkable. There no suspicious areas  otherwise to suggest metastatic disease.   c) CT of the head, chest, abdomen, and pelvis on 01/14/2016: CT of the head:  Negative. CT of the chest revealed a large mass within the left breast measuring 4.2 x 3.3 cm. The mass extends to the skin and there is mild skin thickening. Diffuse adenopathy within the left axilla. These nodes have a diameter up to 1.5 cm. There is adenopathy posterior to the left pectoralis major muscle with several nodes identified; there is a node between the left pectoralis major muscle and the pectoralis minor muscle that measures 3.0 x 1.9 cm. Several other additional nodes identified posterior to the left pectoralis major muscle. CT of the abdomen negative other than for low attenuation left adrenal gland, measuring approximately 1.2 cm likely secondary to underlying adrenal adenoma as well as within the right adrenal gland. Also noted in the pelvis a 2.2 x 1.6 cm low attenuation structure likely felt to be ovarian follicle or  cyst.   d) Baseline CBC and CMP, on 01/08/2016: WBC 9.2, hemoglobin 12.5, hematocrit  38.2%, platelets 358,000. CMP: Glucose 109, BUN 20, creatinine 0.97, sodium  140, potassium 4.4, chloride 99, CO2 of 23, calcium 9.8, protein 8.0, albumin  4.9, total bilirubin 0.5, alkaline phosphatase 94, AST 17, ALT 16.  e) BRCA 2 positive.  f) Follow-up bone scan on 06/06/2016 negative for metastatic disease. Repeat  CT of the abdomen and pelvis on 06/06/2016 revealed unchanged left adrenal  gland consistent with adenoma, 11 mm, no evidence of abdominal or pelvic  distant metastatic disease.   g) October found her dyspneic with O2sat of 83%.  on follow-up 1 week later her oxygen saturation on room air was 98% without any further interventions and she was feeling better despite no interventions.    2.  Peripheral Neuropathy          Malignant neoplasm of upper-outer quadrant of left female breast    1/7/2016 Initial Diagnosis     Malignant neoplasm of upper-outer quadrant of left female breast         1/21/2016 - 6/3/2016 Chemotherapy     DD Adriamycin and Cytoxan x 4 courses followed by Taxol weekly x 12 courses         2/2/2016 Genetic Testing     BRCA 2 Positive         6/27/2016 Surgery     Bilateral MRM's.  Right benign. Left 1.5 cm, up to 4 cm in scattered small lesions including into the deep dermis.  3 out of 13 lymph nodes positive.  Laurent 6 out of 9.  Estrogen and progesterone receptor previously positive HER-2/niraj negative.         7/7/2016 -  Hormonal Therapy     Arimidex         8/10/2016 - 9/21/2016 Radiation     Radiation OncologyTreatment Course:  Dr. Natanael Medellin         10/19/2016 Imaging     Workup of dyspnea included: CT angiogram of chest, negative, 2D ECHO normal with EF 60-65%, Pulmonary function tests with mild reduction in FVC on spirometry, suggestive of restrictive defect, otherwise normal.         11/1/2016 Surgery     Robotic-assisted total laparoscopic hysterectomy, bilateral  salpingo-oophorectomy            HISTORY OF PRESENT ILLNESS:  The patient is a 57 y.o. female, here for follow up on management of history of left breast cancer.  Miguelina has been doing well since we saw her last with no new complaints.  Stable peripheral neuropathy, continues on gabapentin. Tolerating Arimidex without any significant side effects.  No new or concerning findings on chest wall exam, no new or concerning bony aches or pains.    Past Medical History:   Diagnosis Date   • Anemia    • Asthma    • BRCA2 positive    • Malignant neoplasm of upper outer quadrant of female breast     LEFT   • Menopause    • Migraines     HX   • Peripheral neuropathy      Past Surgical History:   Procedure Laterality Date   • CARPAL TUNNEL RELEASE     • MASTECTOMY Bilateral 2016   • TOTAL LAPAROSCOPIC HYSTERECTOMY N/A 11/1/2016    RATLH/BSO   • VENOUS ACCESS DEVICE (PORT) INSERTION AND REMOVAL Right        Allergies   Allergen Reactions   • Ibuprofen Swelling       Family History and Social History reviewed and changed as necessary      REVIEW OF SYSTEM:   Review of Systems   Constitutional: Negative for appetite change, chills, diaphoresis, fever and unexpected weight change.   HENT:   Negative for mouth sores, sore throat and trouble swallowing.    Eyes: Negative for icterus.   Respiratory: Negative for cough, hemoptysis.    Cardiovascular: Negative for chest pain, leg swelling and palpitations.   Gastrointestinal: Negative for abdominal distention, abdominal pain, blood in stool, constipation, diarrhea, nausea and vomiting.   Endocrine: Negative for hot flashes.   Genitourinary: Negative for bladder incontinence, difficulty urinating, dysuria, frequency and hematuria.    Musculoskeletal: Negative for gait problem, neck pain and neck stiffness.   Skin: Negative for rash.   Neurological: Negative for dizziness, headaches, light-headedness. Positive for peripheral neuropathy in both feet.  Hematological: Negative for  "adenopathy. Does not bruise/bleed easily.   Psychiatric/Behavioral: Negative for depression. The patient is not nervous/anxious.    All other systems reviewed and are negative except as stated above in the history of present illness.       PHYSICAL EXAM    Vitals:    04/13/18 1335   BP: 130/74   Pulse: 92   Resp: 18   Temp: 98.3 °F (36.8 °C)   Weight: 91.6 kg (202 lb)   Height: 170.2 cm (67\")     Constitutional: Appears well-developed and well-nourished. No distress.   ECOG: (1) Restricted in physically strenuous activity, ambulatory and able to do work of light nature  HENT:   Head: Normocephalic.   Mouth/Throat: Oropharynx is clear and moist.   Eyes: Conjunctivae are normal. Pupils are equal, round, and reactive. No scleral icterus.   Neck: Neck supple. No JVD present. No thyromegaly present.   Cardiovascular: Normal rate, regular rhythm and normal heart sounds.  Chest: Chest wall exam status post bilateral mastectomies is benign, skin tissue is soft, there are no abnormal masses, nodules or lesions.   Nodes: No cervical, supraclavicular, axillary or inguinal nodes palpable on exam.    Pulmonary/Chest: Breath sounds normal. No respiratory distress.   Abdominal: Soft. Exhibits no distension.   Musculoskeletal:Exhibits no edema, tenderness or deformity. Decreased rom left shoulder.  Neurological: Alert and oriented to person, place, and time. Exhibits normal muscle tone.   Skin: No ecchymosis, no petechiae and no rash noted. Not diaphoretic. No cyanosis. Nails show no clubbing.   Psychiatric: Normal mood and affect.   Vitals reviewed.    Diagnostic data: All previous office notes and outside physician notes including those from Dr. Mishra regarding her pulmonary follow-up and also Dr. Bryan for postsurgical follow-up were reviewed at time of visit.    1.  History of left breast cancer, stage IIIB, hormone receptor positive, HER-2/niraj negative: The patient continues to be without any evidence of disease on clinical " exam and has no new worrisome symptoms.  She continues on Arimidex of which we plan on 10 years with extended adjuvant therapy through July 2026.  We will plan to see her in 1 year for follow-up and chest wall exam.  She has not had bone density testing and we will arrange for that in the next month or so, I have ordered that today and will call her the results.      2.  Peripheral neuropathy after chemotherapy: stable, will continue with gabapentin unchanged.    10 minutes of today's 15 minute appointment was spent in counseling and education in regards to the above.     Cherrie Stone, APRN    04/13/2018

## 2018-05-23 ENCOUNTER — TELEPHONE (OUTPATIENT)
Dept: ONCOLOGY | Facility: CLINIC | Age: 58
End: 2018-05-23

## 2018-05-23 NOTE — TELEPHONE ENCOUNTER
DEXA results show osteopenia, called Miguelina.  She is not taking calcium or vitamin D.  Recommended she take 1200 mg Calcium and 2903-0418 IU Vit D daily.

## 2018-09-07 RX ORDER — ANASTROZOLE 1 MG/1
TABLET ORAL
Qty: 30 TABLET | Refills: 11 | Status: SHIPPED | OUTPATIENT
Start: 2018-09-07 | End: 2018-09-11 | Stop reason: SDUPTHER

## 2018-09-11 RX ORDER — ANASTROZOLE 1 MG/1
TABLET ORAL
Qty: 30 TABLET | Refills: 11 | Status: SHIPPED | OUTPATIENT
Start: 2018-09-11 | End: 2018-09-12 | Stop reason: SDUPTHER

## 2018-09-12 RX ORDER — ANASTROZOLE 1 MG/1
TABLET ORAL
Qty: 30 TABLET | Refills: 11 | Status: SHIPPED | OUTPATIENT
Start: 2018-09-12 | End: 2018-09-12 | Stop reason: SDUPTHER

## 2018-09-12 RX ORDER — ANASTROZOLE 1 MG/1
1 TABLET ORAL DAILY
Qty: 90 TABLET | Refills: 3 | Status: SHIPPED | OUTPATIENT
Start: 2018-09-12

## 2019-04-17 ENCOUNTER — OFFICE VISIT (OUTPATIENT)
Dept: ONCOLOGY | Facility: CLINIC | Age: 59
End: 2019-04-17

## 2019-04-17 VITALS
HEIGHT: 67 IN | BODY MASS INDEX: 32.02 KG/M2 | HEART RATE: 63 BPM | SYSTOLIC BLOOD PRESSURE: 108 MMHG | WEIGHT: 204 LBS | RESPIRATION RATE: 18 BRPM | DIASTOLIC BLOOD PRESSURE: 71 MMHG | TEMPERATURE: 98.9 F

## 2019-04-17 DIAGNOSIS — Z15.01 BRCA2 POSITIVE: ICD-10-CM

## 2019-04-17 DIAGNOSIS — Z17.0 MALIGNANT NEOPLASM OF UPPER-OUTER QUADRANT OF LEFT BREAST IN FEMALE, ESTROGEN RECEPTOR POSITIVE (HCC): Primary | ICD-10-CM

## 2019-04-17 DIAGNOSIS — C50.412 MALIGNANT NEOPLASM OF UPPER-OUTER QUADRANT OF LEFT BREAST IN FEMALE, ESTROGEN RECEPTOR POSITIVE (HCC): Primary | ICD-10-CM

## 2019-04-17 DIAGNOSIS — Z15.09 BRCA2 POSITIVE: ICD-10-CM

## 2019-04-17 PROCEDURE — 99213 OFFICE O/P EST LOW 20 MIN: CPT | Performed by: NURSE PRACTITIONER

## 2019-04-17 RX ORDER — NADOLOL 20 MG/1
TABLET ORAL
Refills: 6 | COMMUNITY
Start: 2019-04-01

## 2019-04-17 RX ORDER — NADOLOL 40 MG/1
TABLET ORAL
COMMUNITY
Start: 2019-02-20

## 2019-04-17 NOTE — PROGRESS NOTES
CHIEF COMPLAINT:   1.  Peripheral Neuropathy                                         2. Follow up for history of breast cancer    Problem List:  Oncology/Hematology History    1. Breast cancer: Clinical stage [id number], stage IIIB, left breast cancer, invasive moderately differentiated ductal adenocarcinoma with apocrine features, Arteaga-Laurent grade II (3 + 3 + 1) estrogen receptor positive, progesterone receptor positive, HER-2/niraj negative. Presented with cutaneous involvement on 01/07/2015:   a) Staging baseline echocardiogram estimated ejection fraction 55% to 60% on  01/14/2016).   b) Staging total body bone scan revealed a small focus of increased tracer activity suggested within the left eleventh posterior rib versus artifact from tracer activity within the renal collecting system. No obvious abnormalities  identified within the left eleventh posterior rib on patient's recent CT examination. Underlying rib appears unremarkable. There no suspicious areas  otherwise to suggest metastatic disease.   c) CT of the head, chest, abdomen, and pelvis on 01/14/2016: CT of the head:  Negative. CT of the chest revealed a large mass within the left breast measuring 4.2 x 3.3 cm. The mass extends to the skin and there is mild skin thickening. Diffuse adenopathy within the left axilla. These nodes have a diameter up to 1.5 cm. There is adenopathy posterior to the left pectoralis major muscle with several nodes identified; there is a node between the left pectoralis major muscle and the pectoralis minor muscle that measures 3.0 x 1.9 cm. Several other additional nodes identified posterior to the left pectoralis major muscle. CT of the abdomen negative other than for low attenuation left adrenal gland, measuring approximately 1.2 cm likely secondary to underlying adrenal adenoma as well as within the right adrenal gland. Also noted in the pelvis a 2.2 x 1.6 cm low attenuation structure likely felt to be ovarian follicle or  cyst.   d) Baseline CBC and CMP, on 01/08/2016: WBC 9.2, hemoglobin 12.5, hematocrit  38.2%, platelets 358,000. CMP: Glucose 109, BUN 20, creatinine 0.97, sodium  140, potassium 4.4, chloride 99, CO2 of 23, calcium 9.8, protein 8.0, albumin  4.9, total bilirubin 0.5, alkaline phosphatase 94, AST 17, ALT 16.  e) BRCA 2 positive.  f) Follow-up bone scan on 06/06/2016 negative for metastatic disease. Repeat  CT of the abdomen and pelvis on 06/06/2016 revealed unchanged left adrenal  gland consistent with adenoma, 11 mm, no evidence of abdominal or pelvic  distant metastatic disease.   g) October found her dyspneic with O2sat of 83%.  on follow-up 1 week later her oxygen saturation on room air was 98% without any further interventions and she was feeling better despite no interventions.    2.  Peripheral Neuropathy          Malignant neoplasm of upper-outer quadrant of left female breast (CMS/HCC)    1/7/2016 Initial Diagnosis     Malignant neoplasm of upper-outer quadrant of left female breast         1/21/2016 - 6/3/2016 Chemotherapy     DD Adriamycin and Cytoxan x 4 courses followed by Taxol weekly x 12 courses         2/2/2016 Genetic Testing     BRCA 2 Positive         6/27/2016 Surgery     Bilateral MRM's.  Right benign. Left 1.5 cm, up to 4 cm in scattered small lesions including into the deep dermis.  3 out of 13 lymph nodes positive.  Laurent 6 out of 9.  Estrogen and progesterone receptor previously positive HER-2/niraj negative.         7/7/2016 -  Hormonal Therapy     Arimidex         8/10/2016 - 9/21/2016 Radiation     Radiation OncologyTreatment Course:  Dr. Natanael Medellin         10/19/2016 Imaging     Workup of dyspnea included: CT angiogram of chest, negative, 2D ECHO normal with EF 60-65%, Pulmonary function tests with mild reduction in FVC on spirometry, suggestive of restrictive defect, otherwise normal.         11/1/2016 Surgery     Robotic-assisted total laparoscopic hysterectomy, bilateral  salpingo-oophorectomy         5/23/2018 Imaging     DEXA bone density testing, osteopenia with lowest T score of -2.2 in the right femoral neck.            HISTORY OF PRESENT ILLNESS:  The patient is a 58 y.o. female, here for follow up on management of history of left breast cancer.  Miguelina has been doing well since we saw her last with no new complaints.  Stable peripheral neuropathy, continues on gabapentin. Tolerating Arimidex without any significant side effects.  No new or concerning findings on chest wall exam, no new or concerning bony aches or pains.    Past Medical History:   Diagnosis Date   • Anemia    • Asthma    • BRCA2 positive    • Malignant neoplasm of upper outer quadrant of female breast (CMS/HCC)     LEFT   • Menopause    • Migraines     HX   • Peripheral neuropathy      Past Surgical History:   Procedure Laterality Date   • CARPAL TUNNEL RELEASE     • MASTECTOMY Bilateral 2016   • TOTAL LAPAROSCOPIC HYSTERECTOMY N/A 11/1/2016    RATLH/BSO   • VENOUS ACCESS DEVICE (PORT) INSERTION AND REMOVAL Right        Allergies   Allergen Reactions   • Ibuprofen Swelling       Family History and Social History reviewed and changed as necessary      REVIEW OF SYSTEM:   Review of Systems   Constitutional: Negative for appetite change, chills, diaphoresis, fever and unexpected weight change.   HENT:   Negative for mouth sores, sore throat and trouble swallowing.    Eyes: Negative for icterus.   Respiratory: Negative for cough, hemoptysis.    Cardiovascular: Negative for chest pain, leg swelling and palpitations.   Gastrointestinal: Negative for abdominal distention, abdominal pain, blood in stool, constipation, diarrhea, nausea and vomiting.   Endocrine: Negative for hot flashes.   Genitourinary: Negative for bladder incontinence, difficulty urinating, dysuria, frequency and hematuria.    Musculoskeletal: Negative for gait problem, neck pain and neck stiffness.   Skin: Negative for rash.   Neurological: Negative  "for dizziness, headaches, light-headedness. Positive for peripheral neuropathy in both hands and feet.  Hematological: Negative for adenopathy. Does not bruise/bleed easily.   Psychiatric/Behavioral: Negative for depression. The patient is not nervous/anxious.    All other systems reviewed and are negative except as stated above in the history of present illness.       PHYSICAL EXAM    Vitals:    04/17/19 1304   BP: 108/71   Pulse: 63   Resp: 18   Temp: 98.9 °F (37.2 °C)   Weight: 92.5 kg (204 lb)   Height: 170.2 cm (67\")     Constitutional: Appears well-developed and well-nourished. No distress.   ECOG: (1) Restricted in physically strenuous activity, ambulatory and able to do work of light nature  HENT:   Head: Normocephalic.   Mouth/Throat: Oropharynx is clear and moist.   Eyes: Conjunctivae are normal. Pupils are equal, round, and reactive. No scleral icterus.   Neck: Neck supple. No JVD present. No thyromegaly present.   Cardiovascular: Normal rate, regular rhythm and normal heart sounds.  Chest: Chest wall exam status post bilateral mastectomies is benign, skin tissue is soft, there are no abnormal masses, nodules or lesions.   Nodes: No cervical, supraclavicular, axillary or inguinal nodes palpable on exam.    Pulmonary/Chest: Breath sounds normal. No respiratory distress.   Abdominal: Soft. Exhibits no distension.   Musculoskeletal:Exhibits no edema, tenderness or deformity. Decreased rom left shoulder.  Neurological: Alert and oriented to person, place, and time. Exhibits normal muscle tone.   Skin: No ecchymosis, no petechiae and no rash noted. Not diaphoretic. No cyanosis. Nails show no clubbing.   Psychiatric: Normal mood and affect.   Vitals reviewed.      1.  History of left breast cancer, stage IIIB, hormone receptor positive, HER-2/niraj negative: The patient continues to be without any evidence of disease on clinical exam and has no new worrisome symptoms.  She continues on Arimidex of which we plan " on 10 years with extended adjuvant therapy through July 2026.  We will plan to see her in 1 year for follow-up and chest wall exam.  She had bone density testing last year that showed osteopenia, she is taking calcium and vitamin D.  We will repeat that next year.      2.  Peripheral neuropathy after chemotherapy: stable, will continue with gabapentin unchanged.    10 minutes of today's 15 minute appointment was spent in counseling and education in regards to the above.     Cherrie Stone, APRN    04/17/2019

## 2019-08-28 ENCOUNTER — TELEPHONE (OUTPATIENT)
Dept: ONCOLOGY | Facility: CLINIC | Age: 59
End: 2019-08-28

## 2019-08-28 DIAGNOSIS — G89.28 POST-MASTECTOMY PAIN SYNDROME: Primary | ICD-10-CM

## 2019-08-28 NOTE — TELEPHONE ENCOUNTER
The patient called stating that she is needing a referral in regards to chronic chest pain and spasms that she has had since her mastectomy and radiation.  The pain has not changed over time however it still happens intermittently.  She previously had seen a specialist in regard to this per her report and was prescribed muscle relaxant but she did not like the way it made her feel therefore she did not take it.  She also was previously on gabapentin for neuropathy however has since stopped taking that.  We discussed referral to pain management or physical therapy.  She states that she would rather try physical therapy as she did not want to take any medication if she did not have to.  I will refer her to physical therapist here in [place].  She will let me know if no improvement or if she needs to be seen sooner than her previously scheduled follow-up.

## 2019-12-04 RX ORDER — ANASTROZOLE 1 MG/1
TABLET ORAL
Qty: 90 TABLET | Refills: 3 | OUTPATIENT
Start: 2019-12-04

## 2019-12-12 RX ORDER — ANASTROZOLE 1 MG/1
TABLET ORAL
Qty: 90 TABLET | Refills: 3 | Status: SHIPPED | OUTPATIENT
Start: 2019-12-12 | End: 2021-06-07

## 2020-07-01 ENCOUNTER — TELEMEDICINE (OUTPATIENT)
Dept: ONCOLOGY | Facility: CLINIC | Age: 60
End: 2020-07-01

## 2020-07-01 VITALS — WEIGHT: 185 LBS | BODY MASS INDEX: 28.98 KG/M2

## 2020-07-01 DIAGNOSIS — Z78.0 POST-MENOPAUSAL: ICD-10-CM

## 2020-07-01 DIAGNOSIS — C50.412 MALIGNANT NEOPLASM OF UPPER-OUTER QUADRANT OF LEFT BREAST IN FEMALE, ESTROGEN RECEPTOR POSITIVE (HCC): Primary | ICD-10-CM

## 2020-07-01 DIAGNOSIS — Z17.0 MALIGNANT NEOPLASM OF UPPER-OUTER QUADRANT OF LEFT BREAST IN FEMALE, ESTROGEN RECEPTOR POSITIVE (HCC): Primary | ICD-10-CM

## 2020-07-01 DIAGNOSIS — Z13.820 OSTEOPOROSIS SCREENING: ICD-10-CM

## 2020-07-01 DIAGNOSIS — Z15.09 BRCA2 POSITIVE: ICD-10-CM

## 2020-07-01 DIAGNOSIS — Z15.01 BRCA2 POSITIVE: ICD-10-CM

## 2020-07-01 PROCEDURE — 99442 PR PHYS/QHP TELEPHONE EVALUATION 11-20 MIN: CPT | Performed by: NURSE PRACTITIONER

## 2020-07-01 NOTE — PROGRESS NOTES
"TELEMEDICINE FOLLOW-UP     In order to limit face-to-face contact and enact \"social distancing\" in light of the COVID-19 outbreaks and in accordance to the recommendations per the CDC, WHO, and our individual department, Miguelina Hensley  was contacted.  Telemedicine was used to screen the patient for needs and conduct her regularly scheduled follow-up.    This was an audio and video enabled telemedicine encounter using [employer].       COVID-19 screening: female specifically denies fever, body aches, cough, difficulty breathing, sore throat, runny nose, recent travel, or known sick exposures.      Oncology/Hematology History    1. Breast cancer: Clinical stage [id number], stage IIIB, left breast cancer, invasive moderately differentiated ductal adenocarcinoma with apocrine features, Arteaga-Laurent grade II (3 + 3 + 1) estrogen receptor positive, progesterone receptor positive, HER-2/niraj negative. Presented with cutaneous involvement on 01/07/2015:   a) Staging baseline echocardiogram estimated ejection fraction 55% to 60% on  01/14/2016).   b) Staging total body bone scan revealed a small focus of increased tracer activity suggested within the left eleventh posterior rib versus artifact from tracer activity within the renal collecting system. No obvious abnormalities  identified within the left eleventh posterior rib on patient's recent CT examination. Underlying rib appears unremarkable. There no suspicious areas  otherwise to suggest metastatic disease.   c) CT of the head, chest, abdomen, and pelvis on 01/14/2016: CT of the head:  Negative. CT of the chest revealed a large mass within the left breast measuring 4.2 x 3.3 cm. The mass extends to the skin and there is mild skin thickening. Diffuse adenopathy within the left axilla. These nodes have a diameter up to 1.5 cm. There is adenopathy posterior to the left pectoralis major muscle with several nodes identified; there is a node between the left pectoralis " major muscle and the pectoralis minor muscle that measures 3.0 x 1.9 cm. Several other additional nodes identified posterior to the left pectoralis major muscle. CT of the abdomen negative other than for low attenuation left adrenal gland, measuring approximately 1.2 cm likely secondary to underlying adrenal adenoma as well as within the right adrenal gland. Also noted in the pelvis a 2.2 x 1.6 cm low attenuation structure likely felt to be ovarian follicle or cyst.   d) Baseline CBC and CMP, on 01/08/2016: WBC 9.2, hemoglobin 12.5, hematocrit  38.2%, platelets 358,000. CMP: Glucose 109, BUN 20, creatinine 0.97, sodium  140, potassium 4.4, chloride 99, CO2 of 23, calcium 9.8, protein 8.0, albumin  4.9, total bilirubin 0.5, alkaline phosphatase 94, AST 17, ALT 16.  e) BRCA 2 positive.  f) Follow-up bone scan on 06/06/2016 negative for metastatic disease. Repeat  CT of the abdomen and pelvis on 06/06/2016 revealed unchanged left adrenal  gland consistent with adenoma, 11 mm, no evidence of abdominal or pelvic  distant metastatic disease.   g) October found her dyspneic with O2sat of 83%.  on follow-up 1 week later her oxygen saturation on room air was 98% without any further interventions and she was feeling better despite no interventions.    2.  Peripheral Neuropathy          Malignant neoplasm of upper-outer quadrant of left female breast (CMS/HCC)    1/7/2016 Initial Diagnosis     Malignant neoplasm of upper-outer quadrant of left female breast      1/21/2016 - 6/3/2016 Chemotherapy     DD Adriamycin and Cytoxan x 4 courses followed by Taxol weekly x 12 courses      2/2/2016 Genetic Testing     BRCA 2 Positive      6/27/2016 Surgery     Bilateral MRM's.  Right benign. Left 1.5 cm, up to 4 cm in scattered small lesions including into the deep dermis.  3 out of 13 lymph nodes positive.  Laurent 6 out of 9.  Estrogen and progesterone receptor previously positive HER-2/niraj negative.      7/7/2016 -  Hormonal  Therapy     Arimidex      8/10/2016 - 9/21/2016 Radiation     Radiation OncologyTreatment Course:  Dr. Natanael Medellin      10/19/2016 Imaging     Workup of dyspnea included: CT angiogram of chest, negative, 2D ECHO normal with EF 60-65%, Pulmonary function tests with mild reduction in FVC on spirometry, suggestive of restrictive defect, otherwise normal.      11/1/2016 Surgery     Robotic-assisted total laparoscopic hysterectomy, bilateral salpingo-oophorectomy      5/23/2018 Imaging     DEXA bone density testing, osteopenia with lowest T score of -2.2 in the right femoral neck.           Brief History: Miguelina has been doing well since I saw her last with no new complaints.  States that she has seen a different neurologist and is now having less chest spasms, states she is on a new medication although she cannot recall the name.  Feeling much better overall.  No new or concerning bone pain.  No new or concerning findings on chest wall exam.  Has been staying healthy at home, sheltering in place in light of current COVID-19 pandemic.  Taking Arimidex daily with no unusual side effects.    Review of Systems: a 14 point review of systems was performed and is negative except as noted above.      MEDICATIONS: Medication reconciliation for the patient was reviewed and confirmed in the electronic medical record.    The following portions of the patient's history were reviewed and updated as appropriate: allergies, current medications, past family history, past medical history, past social history, past surgical history and problem list.        Assessment and Plan:     1.  History of left breast cancer, ER positive stage IIIb, currently on adjuvant therapy with a REM index  2.  BRCA2 positive  3.  Peripheral neuropathy after chemotherapy    Discussion: Patient is doing well, no new worrisome symptoms for disease recurrence.  She states that she feels much better from a neuropathy standpoint, is seen a different neurologist  and is pleased with medication changes.  Tolerating a REM index with no unusual side effects, we plan on 10 years extended adjuvant therapy through July 2026.  She is due for follow-up DEXA scan, last DEXA scan May 2018 showed osteopenia.  We will repeat that in the next few months and I will call her with the results.  She does take calcium and vitamin D daily.  I will see her back in 1 year for follow-up and sooner if she has any concerns.    This visit has been rescheduled as a phone visit to comply with patient safety concerns in accordance with CDC recommendations. Total time of discussion was 15 minutes.    Cherrie Stone, FAVIOLA  07/01/2020

## 2020-07-29 ENCOUNTER — TELEPHONE (OUTPATIENT)
Dept: ONCOLOGY | Facility: CLINIC | Age: 60
End: 2020-07-29

## 2020-07-29 NOTE — TELEPHONE ENCOUNTER
[facility] calling to get DEXA orders faxed to them.    Fax: [phone]  Phone: [phone] option 2

## 2020-09-09 ENCOUNTER — TELEPHONE (OUTPATIENT)
Dept: ONCOLOGY | Facility: CLINIC | Age: 60
End: 2020-09-09

## 2020-09-09 NOTE — TELEPHONE ENCOUNTER
----- Message from FAVIOLA Red sent at 9/9/2020 12:36 PM EDT -----  Regarding: DEXA  Please let Miguelina know her bone density testing was stable with Osteopenia, she needs to continue Calcium and Vitamin D supplement.  Thank you.  Cherrie

## 2020-09-09 NOTE — TELEPHONE ENCOUNTER
Called and lm on patient's phone of the message concerning her DEXA per S. FAVIOLA Stone. Told her to call the office with any questions.

## 2021-06-05 DIAGNOSIS — C50.412 MALIGNANT NEOPLASM OF UPPER-OUTER QUADRANT OF LEFT BREAST IN FEMALE, ESTROGEN RECEPTOR POSITIVE (HCC): Primary | ICD-10-CM

## 2021-06-05 DIAGNOSIS — Z17.0 MALIGNANT NEOPLASM OF UPPER-OUTER QUADRANT OF LEFT BREAST IN FEMALE, ESTROGEN RECEPTOR POSITIVE (HCC): Primary | ICD-10-CM

## 2021-06-07 RX ORDER — ANASTROZOLE 1 MG/1
TABLET ORAL
Qty: 90 TABLET | Refills: 3 | Status: SHIPPED | OUTPATIENT
Start: 2021-06-07

## 2021-07-07 ENCOUNTER — OFFICE VISIT (OUTPATIENT)
Dept: ONCOLOGY | Facility: CLINIC | Age: 61
End: 2021-07-07

## 2021-07-07 VITALS
HEART RATE: 63 BPM | HEIGHT: 67 IN | WEIGHT: 178 LBS | OXYGEN SATURATION: 93 % | BODY MASS INDEX: 27.94 KG/M2 | TEMPERATURE: 97.5 F | DIASTOLIC BLOOD PRESSURE: 68 MMHG | SYSTOLIC BLOOD PRESSURE: 112 MMHG | RESPIRATION RATE: 18 BRPM

## 2021-07-07 DIAGNOSIS — C50.412 MALIGNANT NEOPLASM OF UPPER-OUTER QUADRANT OF LEFT BREAST IN FEMALE, ESTROGEN RECEPTOR POSITIVE (HCC): Primary | ICD-10-CM

## 2021-07-07 DIAGNOSIS — Z15.09 BRCA2 POSITIVE: ICD-10-CM

## 2021-07-07 DIAGNOSIS — Z17.0 MALIGNANT NEOPLASM OF UPPER-OUTER QUADRANT OF LEFT BREAST IN FEMALE, ESTROGEN RECEPTOR POSITIVE (HCC): Primary | ICD-10-CM

## 2021-07-07 DIAGNOSIS — Z15.01 BRCA2 POSITIVE: ICD-10-CM

## 2021-07-07 PROCEDURE — 99214 OFFICE O/P EST MOD 30 MIN: CPT | Performed by: NURSE PRACTITIONER

## 2021-07-07 RX ORDER — CHOLECALCIFEROL (VITAMIN D3) 1250 MCG
CAPSULE ORAL
COMMUNITY
Start: 2021-06-15

## 2021-07-07 RX ORDER — METFORMIN HYDROCHLORIDE 500 MG/1
TABLET, EXTENDED RELEASE ORAL
COMMUNITY
Start: 2021-06-18

## 2021-07-07 NOTE — PROGRESS NOTES
CHIEF COMPLAINT: Breast cancer    Problem List:  Oncology/Hematology History Overview Note   1. Breast cancer: Clinical stage [id number], stage IIIB, left breast cancer, invasive moderately differentiated ductal adenocarcinoma with apocrine features, Arteaga-Laurent grade II (3 + 3 + 1) estrogen receptor positive, progesterone receptor positive, HER-2/niraj negative. Presented with cutaneous involvement on 01/07/2015:   a) Staging baseline echocardiogram estimated ejection fraction 55% to 60% on  01/14/2016).   b) Staging total body bone scan revealed a small focus of increased tracer activity suggested within the left eleventh posterior rib versus artifact from tracer activity within the renal collecting system. No obvious abnormalities  identified within the left eleventh posterior rib on patient's recent CT examination. Underlying rib appears unremarkable. There no suspicious areas  otherwise to suggest metastatic disease.   c) CT of the head, chest, abdomen, and pelvis on 01/14/2016: CT of the head:  Negative. CT of the chest revealed a large mass within the left breast measuring 4.2 x 3.3 cm. The mass extends to the skin and there is mild skin thickening. Diffuse adenopathy within the left axilla. These nodes have a diameter up to 1.5 cm. There is adenopathy posterior to the left pectoralis major muscle with several nodes identified; there is a node between the left pectoralis major muscle and the pectoralis minor muscle that measures 3.0 x 1.9 cm. Several other additional nodes identified posterior to the left pectoralis major muscle. CT of the abdomen negative other than for low attenuation left adrenal gland, measuring approximately 1.2 cm likely secondary to underlying adrenal adenoma as well as within the right adrenal gland. Also noted in the pelvis a 2.2 x 1.6 cm low attenuation structure likely felt to be ovarian follicle or cyst.   d) Baseline CBC and CMP, on 01/08/2016: WBC 9.2, hemoglobin 12.5,  hematocrit  38.2%, platelets 358,000. CMP: Glucose 109, BUN 20, creatinine 0.97, sodium  140, potassium 4.4, chloride 99, CO2 of 23, calcium 9.8, protein 8.0, albumin  4.9, total bilirubin 0.5, alkaline phosphatase 94, AST 17, ALT 16.  e) BRCA 2 positive.  f) Follow-up bone scan on 06/06/2016 negative for metastatic disease. Repeat  CT of the abdomen and pelvis on 06/06/2016 revealed unchanged left adrenal  gland consistent with adenoma, 11 mm, no evidence of abdominal or pelvic  distant metastatic disease.   g) October found her dyspneic with O2sat of 83%.  on follow-up 1 week later her oxygen saturation on room air was 98% without any further interventions and she was feeling better despite no interventions.    2.  Peripheral Neuropathy       Malignant neoplasm of upper-outer quadrant of left female breast (CMS/HCC)   1/7/2016 Initial Diagnosis    Malignant neoplasm of upper-outer quadrant of left female breast     1/21/2016 - 6/3/2016 Chemotherapy    DD Adriamycin and Cytoxan x 4 courses followed by Taxol weekly x 12 courses     2/2/2016 Genetic Testing    BRCA 2 Positive     6/27/2016 Surgery    Bilateral MRM's.  Right benign. Left 1.5 cm, up to 4 cm in scattered small lesions including into the deep dermis.  3 out of 13 lymph nodes positive.  Laurent 6 out of 9.  Estrogen and progesterone receptor previously positive HER-2/niraj negative.  Pathological stage ypT2 ypN1a     7/7/2016 -  Hormonal Therapy    Arimidex     8/10/2016 - 9/21/2016 Radiation    Radiation OncologyTreatment Course:  Dr. Natanael Medellin     10/19/2016 Imaging    Workup of dyspnea included: CT angiogram of chest, negative, 2D ECHO normal with EF 60-65%, Pulmonary function tests with mild reduction in FVC on spirometry, suggestive of restrictive defect, otherwise normal.     11/1/2016 Surgery    Robotic-assisted total laparoscopic hysterectomy, bilateral salpingo-oophorectomy     5/23/2018 Imaging    DEXA bone density testing, osteopenia with  "lowest T score of -2.2 in the right femoral neck.     9/8/2020 Imaging    DEXA bone density testing, Osteopenia         HISTORY OF PRESENT ILLNESS:  The patient is a 61 y.o. female, here for follow up on management of ER positive left breast cancer.  Miguelina has been doing well since we saw her last with no new concerns.  No new or concerning findings on chest wall exam, no new or concerning bony aches or pains.  Reports that since we saw her last she has been diagnosed with type 2 diabetes and is now on Metformin.  Otherwise no change in her health.  She still has occasional spasms in her chest but these are much less then she has had in the past.    Past Medical History:   Diagnosis Date   • Anemia    • Asthma    • BRCA2 positive    • Malignant neoplasm of upper outer quadrant of female breast (CMS/HCC)     LEFT   • Menopause    • Migraines     HX   • Peripheral neuropathy      Past Surgical History:   Procedure Laterality Date   • CARPAL TUNNEL RELEASE     • MASTECTOMY Bilateral 2016   • TOTAL LAPAROSCOPIC HYSTERECTOMY N/A 11/1/2016    RATLH/BSO   • VENOUS ACCESS DEVICE (PORT) INSERTION AND REMOVAL Right        Allergies   Allergen Reactions   • Ibuprofen Swelling       Family History and Social History reviewed and changed as necessary    REVIEW OF SYSTEM:   Negative for new concerns    PHYSICAL EXAM:  General: Well-developed, well-nourished healthy appearing female  Chest: Chest wall exam benign status post bilateral mastectomies, skin tissue is soft, no abnormal masses, nodules, rashes or lesions.  Nodes: No cervical, supraclavicular or axillary nodes palpable on exam.      Vitals:    07/07/21 1131   BP: 112/68   Pulse: 63   Resp: 18   Temp: 97.5 °F (36.4 °C)   SpO2: 93%   Weight: 80.7 kg (178 lb)   Height: 170.2 cm (67\")     Vitals:    07/07/21 1131   PainSc: 0-No pain          ECOG score: 0           Vitals reviewed.        ASSESSMENT & PLAN:    1.  History of left breast cancer, ER positive stage IIIb, " currently on adjuvant therapy with Arimidex  2.  BRCA2 positive  3.  Osteopenia    Discussion: Miguelina continues to do well with no evidence of disease on clinical exam and no new worrisome symptoms.  She is tolerating Arimidex with no unusual side effects.  We plan on up to 10 years extended adjuvant therapy through July 2026.  Her most recent bone density testing September 2020 showed osteopenia that had not changed from prior bone density testing May 2018.  She does take calcium and vitamin D.  We will plan on repeating this next year.  I refilled her Arimidex today for the next year.    Return to clinic in 1 year for follow-up.    This was a level 4, moderate MDM visit with 2 or more stable chronic illnesses and prescription drug management.  Cherrie Stone, APRN    07/07/2021

## 2022-06-29 DIAGNOSIS — Z17.0 MALIGNANT NEOPLASM OF UPPER-OUTER QUADRANT OF LEFT BREAST IN FEMALE, ESTROGEN RECEPTOR POSITIVE: Primary | ICD-10-CM

## 2022-06-29 DIAGNOSIS — C50.412 MALIGNANT NEOPLASM OF UPPER-OUTER QUADRANT OF LEFT BREAST IN FEMALE, ESTROGEN RECEPTOR POSITIVE: Primary | ICD-10-CM

## 2022-06-29 NOTE — TELEPHONE ENCOUNTER
Dr. Esposito/FAVIOLA Barros is out of the office today. LMOM if PT had enough meds until tomorrow when Dr. Esposito returns to the office.  [id number]  ~Shell

## 2022-06-30 ENCOUNTER — TELEPHONE (OUTPATIENT)
Dept: FAMILY MEDICINE CLINIC | Facility: CLINIC | Age: 62
End: 2022-06-30

## 2022-06-30 RX ORDER — ANASTROZOLE 1 MG/1
TABLET ORAL
Qty: 90 TABLET | Refills: 3 | Status: SHIPPED | OUTPATIENT
Start: 2022-06-30

## 2022-07-08 ENCOUNTER — TELEPHONE (OUTPATIENT)
Dept: ONCOLOGY | Facility: CLINIC | Age: 62
End: 2022-07-08

## 2022-07-08 ENCOUNTER — TELEPHONE (OUTPATIENT)
Dept: ONCOLOGY | Facility: OTHER | Age: 62
End: 2022-07-08

## 2022-07-08 NOTE — TELEPHONE ENCOUNTER
PT CALLING TO SCHED APPT. HAS [employer] NOW AND IT IS SHOWING NONPAR. SHE IS GOING TO CALL HER INS AND VERIFY THAT OUR DRS ARE COVERED AND CALL BACK TO SCHED HER YEARLY F/U APPT.     PT GAVE [phone] AND [id number] AS IDS BUT NEITHER VERIFIED.

## 2022-07-08 NOTE — TELEPHONE ENCOUNTER
Caller: Miguelina Hensley    Relationship to patient: Self    Best call back number: [phone]    Chief complaint: WOULD LIKE TO CHANGE TO A VIDEO VISIT    Type of visit: FOLLOW UP    Requested date: PLEASE CALL TO R/S.    If rescheduling, when is the original appointment: 07/13

## 2022-07-14 ENCOUNTER — TELEMEDICINE (OUTPATIENT)
Dept: ONCOLOGY | Facility: CLINIC | Age: 62
End: 2022-07-14

## 2022-07-14 VITALS — HEIGHT: 67 IN | BODY MASS INDEX: 27.88 KG/M2

## 2022-07-14 DIAGNOSIS — Z85.3 HISTORY OF LEFT BREAST CANCER: Primary | ICD-10-CM

## 2022-07-14 DIAGNOSIS — Z15.01 BRCA2 POSITIVE: ICD-10-CM

## 2022-07-14 DIAGNOSIS — Z13.820 OSTEOPOROSIS SCREENING: ICD-10-CM

## 2022-07-14 DIAGNOSIS — Z78.0 POST-MENOPAUSAL: ICD-10-CM

## 2022-07-14 DIAGNOSIS — Z15.09 BRCA2 POSITIVE: ICD-10-CM

## 2022-07-14 PROCEDURE — 99213 OFFICE O/P EST LOW 20 MIN: CPT | Performed by: NURSE PRACTITIONER

## 2022-07-14 RX ORDER — TIZANIDINE 2 MG/1
TABLET ORAL
COMMUNITY
Start: 2022-04-20

## 2022-07-14 RX ORDER — LEVOTHYROXINE SODIUM 88 UG/1
TABLET ORAL
COMMUNITY
Start: 2022-07-09

## 2022-07-14 RX ORDER — ATORVASTATIN CALCIUM 20 MG/1
20 TABLET, FILM COATED ORAL EVERY EVENING
COMMUNITY
Start: 2022-04-29

## 2022-07-14 NOTE — PROGRESS NOTES
This was an audio and video enabled telemedicine encounter via [employer]  Experienced technical difficulties during visit with audio.  Miguelina was at her home residence for our visit today, I was at our clinic location on [address], [place], [place]      CHIEF COMPLAINT: History of breast cancer    Problem List:  Oncology/Hematology History Overview Note   1. Breast cancer: Clinical stage [id number], stage IIIB, left breast cancer, invasive moderately differentiated ductal adenocarcinoma with apocrine features, Arteaga-Laurent grade II (3 + 3 + 1) estrogen receptor positive, progesterone receptor positive, HER-2/niraj negative. Presented with cutaneous involvement on 01/07/2015:   a) Staging baseline echocardiogram estimated ejection fraction 55% to 60% on  01/14/2016).   b) Staging total body bone scan revealed a small focus of increased tracer activity suggested within the left eleventh posterior rib versus artifact from tracer activity within the renal collecting system. No obvious abnormalities  identified within the left eleventh posterior rib on patient's recent CT examination. Underlying rib appears unremarkable. There no suspicious areas  otherwise to suggest metastatic disease.   c) CT of the head, chest, abdomen, and pelvis on 01/14/2016: CT of the head:  Negative. CT of the chest revealed a large mass within the left breast measuring 4.2 x 3.3 cm. The mass extends to the skin and there is mild skin thickening. Diffuse adenopathy within the left axilla. These nodes have a diameter up to 1.5 cm. There is adenopathy posterior to the left pectoralis major muscle with several nodes identified; there is a node between the left pectoralis major muscle and the pectoralis minor muscle that measures 3.0 x 1.9 cm. Several other additional nodes identified posterior to the left pectoralis major muscle. CT of the abdomen negative other than for low attenuation left adrenal gland, measuring approximately 1.2 cm likely  secondary to underlying adrenal adenoma as well as within the right adrenal gland. Also noted in the pelvis a 2.2 x 1.6 cm low attenuation structure likely felt to be ovarian follicle or cyst.   d) Baseline CBC and CMP, on 01/08/2016: WBC 9.2, hemoglobin 12.5, hematocrit  38.2%, platelets 358,000. CMP: Glucose 109, BUN 20, creatinine 0.97, sodium  140, potassium 4.4, chloride 99, CO2 of 23, calcium 9.8, protein 8.0, albumin  4.9, total bilirubin 0.5, alkaline phosphatase 94, AST 17, ALT 16.  e) BRCA 2 positive.  f) Follow-up bone scan on 06/06/2016 negative for metastatic disease. Repeat  CT of the abdomen and pelvis on 06/06/2016 revealed unchanged left adrenal  gland consistent with adenoma, 11 mm, no evidence of abdominal or pelvic  distant metastatic disease.   g) October found her dyspneic with O2sat of 83%.  on follow-up 1 week later her oxygen saturation on room air was 98% without any further interventions and she was feeling better despite no interventions.    2.  Peripheral Neuropathy       Malignant neoplasm of upper-outer quadrant of left female breast (HCC)   1/7/2016 Initial Diagnosis    Malignant neoplasm of upper-outer quadrant of left female breast     1/21/2016 - 6/3/2016 Chemotherapy    DD Adriamycin and Cytoxan x 4 courses followed by Taxol weekly x 12 courses     2/2/2016 Genetic Testing    BRCA 2 Positive     6/27/2016 Surgery    Bilateral MRM's.  Right benign. Left 1.5 cm, up to 4 cm in scattered small lesions including into the deep dermis.  3 out of 13 lymph nodes positive.  Laurent 6 out of 9.  Estrogen and progesterone receptor previously positive HER-2/niraj negative.  Pathological stage ypT2 ypN1a     7/7/2016 -  Hormonal Therapy    Arimidex     8/10/2016 - 9/21/2016 Radiation    Radiation OncologyTreatment Course:  Dr. Natanael Medellin     10/19/2016 Imaging    Workup of dyspnea included: CT angiogram of chest, negative, 2D ECHO normal with EF 60-65%, Pulmonary function tests with mild  reduction in FVC on spirometry, suggestive of restrictive defect, otherwise normal.     11/1/2016 Surgery    Robotic-assisted total laparoscopic hysterectomy, bilateral salpingo-oophorectomy     5/23/2018 Imaging    DEXA bone density testing, osteopenia with lowest T score of -2.2 in the right femoral neck.     9/8/2020 Imaging    DEXA bone density testing, Osteopenia         HISTORY OF PRESENT ILLNESS:  The patient is a 62 y.o. female, here for follow up on management of history of hormone receptor positive breast cancer and BRCA2 positivity.  Miguelina has been doing well since we saw her last with no new concerns.  She has no new or concerning findings on chest wall exam, no new or concerning bony aches or pains.  She still has occasional chest wall spasms when she overdoes it but otherwise reports that she feels good.  This is not new, it is not worsening with time.    Past Medical History:   Diagnosis Date   • Anemia    • Asthma    • BRCA2 positive    • Malignant neoplasm of upper outer quadrant of female breast (HCC)     LEFT   • Menopause    • Migraines     HX   • Peripheral neuropathy      Past Surgical History:   Procedure Laterality Date   • CARPAL TUNNEL RELEASE     • MASTECTOMY Bilateral 2016   • TOTAL LAPAROSCOPIC HYSTERECTOMY N/A 11/1/2016    RATLH/BSO   • VENOUS ACCESS DEVICE (PORT) INSERTION AND REMOVAL Right        Allergies   Allergen Reactions   • Ibuprofen Swelling       Family History and Social History reviewed and changed as necessary    REVIEW OF SYSTEM:   Negative for new somatic concerns    PHYSICAL EXAM:  Limited with video visit initially however converted to audio only after technical difficulties.  On brief video visit, Miguelina appeared in her usual state of health, she is a well-nourished, well-developed healthy-appearing female in no distress.  No jaundice or icterus noted.  Conversation is appropriate.  No respiratory difficulty with conversation.    Vitals:    07/14/22 1101   Height:  "170.2 cm (67\")     Vitals:    07/14/22 1101   PainSc: 0-No pain          ECOG score: 0           Vitals reviewed.    ECOG: (0) Fully Active - Able to Carry On All Pre-disease Performance Without Restriction    Lab Results   Component Value Date    HGB 10.0 (L) 11/02/2016    HCT 30.7 (L) 11/02/2016    .3 (H) 11/02/2016     11/02/2016    WBC 8.95 11/02/2016    NEUTROABS 7.26 11/02/2016    LYMPHSABS 0.83 11/02/2016    MONOSABS 0.79 11/02/2016    EOSABS 0.03 (L) 11/02/2016    BASOSABS 0.01 11/02/2016       Lab Results   Component Value Date    GLUCOSE 131 (H) 11/02/2016    BUN 11 11/02/2016    CREATININE 0.90 11/02/2016     11/02/2016    K 4.3 11/02/2016     11/02/2016    CO2 30.0 11/02/2016    CALCIUM 9.3 11/02/2016    PROTEINTOT 7.5 10/31/2016    ALBUMIN 4.20 10/31/2016    BILITOT 0.4 10/31/2016    ALKPHOS 97 10/31/2016    AST 18 10/31/2016    ALT 20 10/31/2016             ASSESSMENT & PLAN:    1.  History of left breast cancer, ER positive, stage IIIb currently on adjuvant therapy with Arimidex  2.  BRCA2 positive  3.  Osteopenia    Discussion: Miguelina is doing well, no symptoms worrisome of disease recurrence and no evidence of recurrence on self exam.  She is tolerating Arimidex with no unusual side effects, we plan on 10 years extended adjuvant therapy through July 2026 as long as she is tolerating.  She asked today about whether or not she would do 10 years versus 7 years, I discussed with her that as long as she is tolerating Arimidex and has no worsening of her bone density that in light of her BRCA2 positivity and node positive disease we would go to 10 years.  She states understanding and agreement with plan.  We will repeat her bone density testing this fall and I have ordered that today, last bone density testing was in September 2020 that showed osteopenia that had not changed from prior bone density testing.    Return to clinic in 1 year for follow-up.    I spent 20 minutes caring " wilma Mcintyre on this date of service. This time includes time spent by me in the following activities: preparing for the visit, reviewing tests, performing a medically appropriate examination and/or evaluation, ordering medications, tests, or procedures and documenting information in the medical record.     Cherrie Stone, APRN    07/14/2022

## 2022-10-04 ENCOUNTER — TELEPHONE (OUTPATIENT)
Dept: ONCOLOGY | Facility: CLINIC | Age: 62
End: 2022-10-04

## 2022-10-04 NOTE — TELEPHONE ENCOUNTER
AMISHA, PATIENT CALLED BACK & I THOUGHT YESSICA'S NOTE WAS FOR THE HUB TO READ TO PATIENT SO I DID & THEN REALIZED I HAD NOT SCROLLED DOWN TO YOUR MESSAGE.  PATIENT WAS OKAY WITH THE MESSAGE, SORRY.

## 2023-04-24 DIAGNOSIS — C50.412 MALIGNANT NEOPLASM OF UPPER-OUTER QUADRANT OF LEFT BREAST IN FEMALE, ESTROGEN RECEPTOR POSITIVE: ICD-10-CM

## 2023-04-24 DIAGNOSIS — Z17.0 MALIGNANT NEOPLASM OF UPPER-OUTER QUADRANT OF LEFT BREAST IN FEMALE, ESTROGEN RECEPTOR POSITIVE: ICD-10-CM

## 2023-04-24 RX ORDER — ANASTROZOLE 1 MG/1
TABLET ORAL
Refills: 3 | OUTPATIENT
Start: 2023-04-24

## 2023-08-23 ENCOUNTER — TRANSCRIBE ORDERS (OUTPATIENT)
Dept: ADMINISTRATIVE | Facility: HOSPITAL | Age: 63
End: 2023-08-23
Payer: MEDICARE

## 2023-08-23 DIAGNOSIS — K44.9 DIAPHRAGMATIC HERNIA WITHOUT OBSTRUCTION AND WITHOUT GANGRENE: Primary | ICD-10-CM

## 2023-11-03 ENCOUNTER — HOSPITAL ENCOUNTER (OUTPATIENT)
Dept: NUCLEAR MEDICINE | Facility: HOSPITAL | Age: 63
Discharge: HOME | End: 2023-11-03
Payer: MEDICARE

## 2023-11-03 DIAGNOSIS — K44.9 DIAPHRAGMATIC HERNIA WITHOUT OBSTRUCTION AND WITHOUT GANGRENE: ICD-10-CM

## 2023-11-03 PROCEDURE — 78264 GASTRIC EMPTYING IMG STUDY: CPT

## 2023-11-03 PROCEDURE — A9541 TC99M SULFUR COLLOID: HCPCS | Performed by: SURGERY

## 2023-11-03 PROCEDURE — 0 TECHNETIUM SULFUR COLLOID: Performed by: SURGERY

## 2023-11-03 RX ADMIN — TECHNETIUM TC 99M SULFUR COLLOID 1 DOSE: KIT at 09:03

## 2024-03-01 ENCOUNTER — PRE-ADMISSION TESTING (OUTPATIENT)
Dept: PREADMISSION TESTING | Facility: HOSPITAL | Age: 64
End: 2024-03-01
Payer: MEDICARE

## 2024-03-01 VITALS — BODY MASS INDEX: 24.39 KG/M2 | WEIGHT: 155.42 LBS | HEIGHT: 67 IN

## 2024-03-01 LAB
ALBUMIN SERPL-MCNC: 5.2 G/DL (ref 3.5–5.2)
ALBUMIN/GLOB SERPL: 1.6 G/DL
ALP SERPL-CCNC: 91 U/L (ref 39–117)
ALT SERPL W P-5'-P-CCNC: 15 U/L (ref 1–33)
ANION GAP SERPL CALCULATED.3IONS-SCNC: 13 MMOL/L (ref 5–15)
APTT PPP: 36.6 SECONDS (ref 22–39)
AST SERPL-CCNC: 20 U/L (ref 1–32)
BILIRUB SERPL-MCNC: 0.7 MG/DL (ref 0–1.2)
BUN SERPL-MCNC: 11 MG/DL (ref 8–23)
BUN/CREAT SERPL: 15.1 (ref 7–25)
CALCIUM SPEC-SCNC: 10 MG/DL (ref 8.6–10.5)
CHLORIDE SERPL-SCNC: 97 MMOL/L (ref 98–107)
CO2 SERPL-SCNC: 26 MMOL/L (ref 22–29)
CREAT SERPL-MCNC: 0.73 MG/DL (ref 0.57–1)
DEPRECATED RDW RBC AUTO: 47.9 FL (ref 37–54)
EGFRCR SERPLBLD CKD-EPI 2021: 91.4 ML/MIN/1.73
ERYTHROCYTE [DISTWIDTH] IN BLOOD BY AUTOMATED COUNT: 13 % (ref 12.3–15.4)
GLOBULIN UR ELPH-MCNC: 3.2 GM/DL
GLUCOSE SERPL-MCNC: 137 MG/DL (ref 65–99)
HCT VFR BLD AUTO: 40.5 % (ref 34–46.6)
HGB BLD-MCNC: 13 G/DL (ref 12–15.9)
INR PPP: 0.96 (ref 0.89–1.12)
MCH RBC QN AUTO: 32.6 PG (ref 26.6–33)
MCHC RBC AUTO-ENTMCNC: 32.1 G/DL (ref 31.5–35.7)
MCV RBC AUTO: 101.5 FL (ref 79–97)
PLATELET # BLD AUTO: 303 10*3/MM3 (ref 140–450)
PMV BLD AUTO: 10.3 FL (ref 6–12)
POTASSIUM SERPL-SCNC: 4.2 MMOL/L (ref 3.5–5.2)
PROT SERPL-MCNC: 8.4 G/DL (ref 6–8.5)
PROTHROMBIN TIME: 12.9 SECONDS (ref 12.2–14.5)
QT INTERVAL: 420 MS
QTC INTERVAL: 401 MS
RBC # BLD AUTO: 3.99 10*6/MM3 (ref 3.77–5.28)
SODIUM SERPL-SCNC: 136 MMOL/L (ref 136–145)
WBC NRBC COR # BLD AUTO: 9.31 10*3/MM3 (ref 3.4–10.8)

## 2024-03-01 PROCEDURE — 36415 COLL VENOUS BLD VENIPUNCTURE: CPT

## 2024-03-01 PROCEDURE — 93005 ELECTROCARDIOGRAM TRACING: CPT

## 2024-03-01 PROCEDURE — 93010 ELECTROCARDIOGRAM REPORT: CPT | Performed by: INTERNAL MEDICINE

## 2024-03-01 PROCEDURE — 85027 COMPLETE CBC AUTOMATED: CPT

## 2024-03-01 PROCEDURE — 85610 PROTHROMBIN TIME: CPT

## 2024-03-01 PROCEDURE — 80053 COMPREHEN METABOLIC PANEL: CPT

## 2024-03-01 PROCEDURE — 85730 THROMBOPLASTIN TIME PARTIAL: CPT

## 2024-03-01 RX ORDER — CETIRIZINE HYDROCHLORIDE 10 MG/1
10 TABLET ORAL DAILY
COMMUNITY

## 2024-03-01 RX ORDER — GUAIFENESIN 600 MG/1
300 TABLET, EXTENDED RELEASE ORAL 2 TIMES DAILY
COMMUNITY

## 2024-03-01 NOTE — PAT
An arrival time for procedure was not provided during PAT visit. If patient had any questions or concerns about their arrival time, they were instructed to contact their surgeon/physician.  Additionally, if the patient referred to an arrival time that was acquired from their my chart account, patient was encouraged to verify that time with their surgeon/physician. Arrival times are NOT provided in Pre Admission Testing Department.      Patient to apply Chlorhexadine wipes  to surgical area (as instructed) the night before procedure and the AM of procedure. Wipes provided.

## 2024-03-06 ENCOUNTER — ANESTHESIA EVENT (OUTPATIENT)
Dept: PERIOP | Facility: HOSPITAL | Age: 64
End: 2024-03-06
Payer: MEDICARE

## 2024-03-07 ENCOUNTER — HOSPITAL ENCOUNTER (OUTPATIENT)
Facility: HOSPITAL | Age: 64
Discharge: HOME | End: 2024-03-08
Attending: SURGERY | Admitting: SURGERY
Payer: MEDICARE

## 2024-03-07 ENCOUNTER — ANESTHESIA (OUTPATIENT)
Dept: PERIOP | Facility: HOSPITAL | Age: 64
End: 2024-03-07
Payer: MEDICARE

## 2024-03-07 DIAGNOSIS — K44.9 PARAESOPHAGEAL HERNIA: Primary | ICD-10-CM

## 2024-03-07 PROBLEM — J44.9 CHRONIC OBSTRUCTIVE LUNG DISEASE: Status: ACTIVE | Noted: 2023-01-30

## 2024-03-07 PROBLEM — K74.60 CIRRHOSIS OF LIVER: Status: ACTIVE | Noted: 2023-01-30

## 2024-03-07 LAB — GLUCOSE BLDC GLUCOMTR-MCNC: 129 MG/DL (ref 70–130)

## 2024-03-07 PROCEDURE — P9041 ALBUMIN (HUMAN),5%, 50ML: HCPCS | Performed by: ANESTHESIOLOGY

## 2024-03-07 PROCEDURE — 82948 REAGENT STRIP/BLOOD GLUCOSE: CPT

## 2024-03-07 PROCEDURE — 25010000002 FENTANYL CITRATE (PF) 100 MCG/2ML SOLUTION: Performed by: ANESTHESIOLOGY

## 2024-03-07 PROCEDURE — 25010000002 ALBUMIN HUMAN 5% PER 50 ML: Performed by: ANESTHESIOLOGY

## 2024-03-07 PROCEDURE — 25810000003 LACTATED RINGERS PER 1000 ML: Performed by: SURGERY

## 2024-03-07 PROCEDURE — 25010000002 FENTANYL CITRATE (PF) 50 MCG/ML SOLUTION

## 2024-03-07 PROCEDURE — 25010000002 ONDANSETRON PER 1 MG: Performed by: SURGERY

## 2024-03-07 PROCEDURE — 25010000002 HYDROMORPHONE 1 MG/ML SOLUTION

## 2024-03-07 PROCEDURE — 25810000003 SODIUM CHLORIDE 0.9 % SOLUTION

## 2024-03-07 PROCEDURE — 25010000002 SUGAMMADEX 200 MG/2ML SOLUTION: Performed by: ANESTHESIOLOGY

## 2024-03-07 PROCEDURE — 63710000001 PROMETHAZINE PER 25 MG: Performed by: SURGERY

## 2024-03-07 PROCEDURE — 25010000002 PROPOFOL 10 MG/ML EMULSION: Performed by: ANESTHESIOLOGY

## 2024-03-07 PROCEDURE — 25010000002 DEXAMETHASONE PER 1 MG: Performed by: ANESTHESIOLOGY

## 2024-03-07 PROCEDURE — 25010000002 ONDANSETRON PER 1 MG: Performed by: ANESTHESIOLOGY

## 2024-03-07 PROCEDURE — 25010000002 GLYCOPYRROLATE 0.4 MG/2ML SOLUTION: Performed by: ANESTHESIOLOGY

## 2024-03-07 PROCEDURE — C1781 MESH (IMPLANTABLE): HCPCS | Performed by: SURGERY

## 2024-03-07 PROCEDURE — 25010000002 HYDROMORPHONE HCL PF 50 MG/5ML SOLUTION

## 2024-03-07 PROCEDURE — 25010000002 CEFAZOLIN PER 500 MG: Performed by: SURGERY

## 2024-03-07 PROCEDURE — 88302 TISSUE EXAM BY PATHOLOGIST: CPT | Performed by: SURGERY

## 2024-03-07 PROCEDURE — 25810000003 LACTATED RINGERS PER 1000 ML: Performed by: ANESTHESIOLOGY

## 2024-03-07 DEVICE — PHASIX ST MESH, RECTANGLE
Type: IMPLANTABLE DEVICE | Site: ABDOMEN | Status: FUNCTIONAL
Brand: PHASIX ST MESH

## 2024-03-07 DEVICE — LIGACLIP 10-M/L, 10MM ENDOSCOPIC ROTATING MULTIPLE CLIP APPLIERS
Type: IMPLANTABLE DEVICE | Site: ABDOMEN | Status: FUNCTIONAL
Brand: LIGACLIP

## 2024-03-07 RX ORDER — LIDOCAINE HYDROCHLORIDE 10 MG/ML
0.5 INJECTION, SOLUTION EPIDURAL; INFILTRATION; INTRACAUDAL; PERINEURAL ONCE AS NEEDED
Status: DISCONTINUED | OUTPATIENT
Start: 2024-03-07 | End: 2024-03-07 | Stop reason: HOSPADM

## 2024-03-07 RX ORDER — METFORMIN HYDROCHLORIDE 500 MG/1
1000 TABLET, EXTENDED RELEASE ORAL
Status: DISCONTINUED | OUTPATIENT
Start: 2024-03-07 | End: 2024-03-08 | Stop reason: HOSPADM

## 2024-03-07 RX ORDER — SODIUM CHLORIDE 9 MG/ML
40 INJECTION, SOLUTION INTRAVENOUS AS NEEDED
Status: DISCONTINUED | OUTPATIENT
Start: 2024-03-07 | End: 2024-03-07 | Stop reason: HOSPADM

## 2024-03-07 RX ORDER — LABETALOL HYDROCHLORIDE 5 MG/ML
5 INJECTION, SOLUTION INTRAVENOUS
Status: DISCONTINUED | OUTPATIENT
Start: 2024-03-07 | End: 2024-03-07 | Stop reason: HOSPADM

## 2024-03-07 RX ORDER — LEVOTHYROXINE SODIUM 0.07 MG/1
75 TABLET ORAL
Status: DISCONTINUED | OUTPATIENT
Start: 2024-03-07 | End: 2024-03-08 | Stop reason: HOSPADM

## 2024-03-07 RX ORDER — SODIUM CHLORIDE 0.9 % (FLUSH) 0.9 %
3 SYRINGE (ML) INJECTION EVERY 12 HOURS SCHEDULED
Status: DISCONTINUED | OUTPATIENT
Start: 2024-03-07 | End: 2024-03-07 | Stop reason: HOSPADM

## 2024-03-07 RX ORDER — SODIUM CHLORIDE 0.9 % (FLUSH) 0.9 %
10 SYRINGE (ML) INJECTION EVERY 12 HOURS SCHEDULED
Status: DISCONTINUED | OUTPATIENT
Start: 2024-03-07 | End: 2024-03-07 | Stop reason: HOSPADM

## 2024-03-07 RX ORDER — MIDAZOLAM HYDROCHLORIDE 1 MG/ML
1 INJECTION INTRAMUSCULAR; INTRAVENOUS
Status: DISCONTINUED | OUTPATIENT
Start: 2024-03-07 | End: 2024-03-07 | Stop reason: HOSPADM

## 2024-03-07 RX ORDER — GUAIFENESIN 600 MG/1
600 TABLET, EXTENDED RELEASE ORAL 2 TIMES DAILY
Status: DISCONTINUED | OUTPATIENT
Start: 2024-03-07 | End: 2024-03-08 | Stop reason: HOSPADM

## 2024-03-07 RX ORDER — PHENYLEPHRINE HCL IN 0.9% NACL 1 MG/10 ML
SYRINGE (ML) INTRAVENOUS AS NEEDED
Status: DISCONTINUED | OUTPATIENT
Start: 2024-03-07 | End: 2024-03-07 | Stop reason: SURG

## 2024-03-07 RX ORDER — HYDROCODONE BITARTRATE AND ACETAMINOPHEN 5; 325 MG/1; MG/1
1 TABLET ORAL ONCE AS NEEDED
Status: DISCONTINUED | OUTPATIENT
Start: 2024-03-07 | End: 2024-03-07 | Stop reason: HOSPADM

## 2024-03-07 RX ORDER — SODIUM CHLORIDE, SODIUM LACTATE, POTASSIUM CHLORIDE, CALCIUM CHLORIDE 600; 310; 30; 20 MG/100ML; MG/100ML; MG/100ML; MG/100ML
75 INJECTION, SOLUTION INTRAVENOUS CONTINUOUS
Status: DISCONTINUED | OUTPATIENT
Start: 2024-03-07 | End: 2024-03-08

## 2024-03-07 RX ORDER — FAMOTIDINE 20 MG/1
20 TABLET, FILM COATED ORAL ONCE
Status: COMPLETED | OUTPATIENT
Start: 2024-03-07 | End: 2024-03-07

## 2024-03-07 RX ORDER — HYDROMORPHONE HYDROCHLORIDE 1 MG/ML
0.5 INJECTION, SOLUTION INTRAMUSCULAR; INTRAVENOUS; SUBCUTANEOUS
Status: DISCONTINUED | OUTPATIENT
Start: 2024-03-07 | End: 2024-03-07 | Stop reason: HOSPADM

## 2024-03-07 RX ORDER — MAGNESIUM HYDROXIDE 1200 MG/15ML
LIQUID ORAL AS NEEDED
Status: DISCONTINUED | OUTPATIENT
Start: 2024-03-07 | End: 2024-03-07 | Stop reason: HOSPADM

## 2024-03-07 RX ORDER — FENTANYL CITRATE 50 UG/ML
50 INJECTION, SOLUTION INTRAMUSCULAR; INTRAVENOUS
Status: DISCONTINUED | OUTPATIENT
Start: 2024-03-07 | End: 2024-03-07 | Stop reason: HOSPADM

## 2024-03-07 RX ORDER — DOCUSATE SODIUM 50 MG/5 ML
100 LIQUID (ML) ORAL DAILY
Status: DISCONTINUED | OUTPATIENT
Start: 2024-03-07 | End: 2024-03-08 | Stop reason: HOSPADM

## 2024-03-07 RX ORDER — DROPERIDOL 2.5 MG/ML
0.62 INJECTION, SOLUTION INTRAMUSCULAR; INTRAVENOUS ONCE AS NEEDED
Status: DISCONTINUED | OUTPATIENT
Start: 2024-03-07 | End: 2024-03-07 | Stop reason: HOSPADM

## 2024-03-07 RX ORDER — LIDOCAINE HYDROCHLORIDE 10 MG/ML
INJECTION, SOLUTION EPIDURAL; INFILTRATION; INTRACAUDAL; PERINEURAL AS NEEDED
Status: DISCONTINUED | OUTPATIENT
Start: 2024-03-07 | End: 2024-03-07 | Stop reason: SURG

## 2024-03-07 RX ORDER — SCOLOPAMINE TRANSDERMAL SYSTEM 1 MG/1
PATCH, EXTENDED RELEASE TRANSDERMAL AS NEEDED
Status: DISCONTINUED | OUTPATIENT
Start: 2024-03-07 | End: 2024-03-07 | Stop reason: SURG

## 2024-03-07 RX ORDER — DEXAMETHASONE SODIUM PHOSPHATE 4 MG/ML
INJECTION, SOLUTION INTRA-ARTICULAR; INTRALESIONAL; INTRAMUSCULAR; INTRAVENOUS; SOFT TISSUE AS NEEDED
Status: DISCONTINUED | OUTPATIENT
Start: 2024-03-07 | End: 2024-03-07 | Stop reason: SURG

## 2024-03-07 RX ORDER — NALOXONE HCL 0.4 MG/ML
0.1 VIAL (ML) INJECTION
Status: DISCONTINUED | OUTPATIENT
Start: 2024-03-07 | End: 2024-03-08 | Stop reason: HOSPADM

## 2024-03-07 RX ORDER — HEPARIN SODIUM 5000 [USP'U]/ML
5000 INJECTION, SOLUTION INTRAVENOUS; SUBCUTANEOUS EVERY 8 HOURS SCHEDULED
Status: DISCONTINUED | OUTPATIENT
Start: 2024-03-08 | End: 2024-03-08 | Stop reason: HOSPADM

## 2024-03-07 RX ORDER — HYDROMORPHONE HCL/0.9% NACL/PF 10 MG/50ML
PATIENT CONTROLLED ANALGESIA SYRINGE INTRAVENOUS
Status: COMPLETED
Start: 2024-03-07 | End: 2024-03-07

## 2024-03-07 RX ORDER — ONDANSETRON 2 MG/ML
4 INJECTION INTRAMUSCULAR; INTRAVENOUS EVERY 6 HOURS PRN
Status: DISCONTINUED | OUTPATIENT
Start: 2024-03-07 | End: 2024-03-08 | Stop reason: HOSPADM

## 2024-03-07 RX ORDER — BUPIVACAINE HYDROCHLORIDE AND EPINEPHRINE 2.5; 5 MG/ML; UG/ML
INJECTION, SOLUTION EPIDURAL; INFILTRATION; INTRACAUDAL; PERINEURAL AS NEEDED
Status: DISCONTINUED | OUTPATIENT
Start: 2024-03-07 | End: 2024-03-07 | Stop reason: HOSPADM

## 2024-03-07 RX ORDER — PANTOPRAZOLE SODIUM 40 MG/1
40 TABLET, DELAYED RELEASE ORAL DAILY
Status: DISCONTINUED | OUTPATIENT
Start: 2024-03-07 | End: 2024-03-08 | Stop reason: HOSPADM

## 2024-03-07 RX ORDER — SODIUM CHLORIDE 0.9 % (FLUSH) 0.9 %
3-10 SYRINGE (ML) INJECTION AS NEEDED
Status: DISCONTINUED | OUTPATIENT
Start: 2024-03-07 | End: 2024-03-07 | Stop reason: HOSPADM

## 2024-03-07 RX ORDER — PROMETHAZINE HYDROCHLORIDE 25 MG/1
25 SUPPOSITORY RECTAL ONCE AS NEEDED
Status: DISCONTINUED | OUTPATIENT
Start: 2024-03-07 | End: 2024-03-07 | Stop reason: HOSPADM

## 2024-03-07 RX ORDER — DROPERIDOL 2.5 MG/ML
0.62 INJECTION, SOLUTION INTRAMUSCULAR; INTRAVENOUS
Status: DISCONTINUED | OUTPATIENT
Start: 2024-03-07 | End: 2024-03-07 | Stop reason: HOSPADM

## 2024-03-07 RX ORDER — ATORVASTATIN CALCIUM 20 MG/1
20 TABLET, FILM COATED ORAL NIGHTLY
Status: DISCONTINUED | OUTPATIENT
Start: 2024-03-07 | End: 2024-03-08 | Stop reason: HOSPADM

## 2024-03-07 RX ORDER — ONDANSETRON 4 MG/1
4 TABLET, ORALLY DISINTEGRATING ORAL EVERY 6 HOURS PRN
Status: DISCONTINUED | OUTPATIENT
Start: 2024-03-07 | End: 2024-03-08 | Stop reason: HOSPADM

## 2024-03-07 RX ORDER — IPRATROPIUM BROMIDE AND ALBUTEROL SULFATE 2.5; .5 MG/3ML; MG/3ML
3 SOLUTION RESPIRATORY (INHALATION) ONCE AS NEEDED
Status: DISCONTINUED | OUTPATIENT
Start: 2024-03-07 | End: 2024-03-07 | Stop reason: HOSPADM

## 2024-03-07 RX ORDER — SODIUM CHLORIDE 0.9 % (FLUSH) 0.9 %
10 SYRINGE (ML) INJECTION AS NEEDED
Status: DISCONTINUED | OUTPATIENT
Start: 2024-03-07 | End: 2024-03-07 | Stop reason: HOSPADM

## 2024-03-07 RX ORDER — HYDRALAZINE HYDROCHLORIDE 20 MG/ML
5 INJECTION INTRAMUSCULAR; INTRAVENOUS
Status: DISCONTINUED | OUTPATIENT
Start: 2024-03-07 | End: 2024-03-07 | Stop reason: HOSPADM

## 2024-03-07 RX ORDER — FAMOTIDINE 20 MG/1
20 TABLET, FILM COATED ORAL
Status: DISCONTINUED | OUTPATIENT
Start: 2024-03-07 | End: 2024-03-07 | Stop reason: HOSPADM

## 2024-03-07 RX ORDER — PROPOFOL 10 MG/ML
VIAL (ML) INTRAVENOUS CONTINUOUS PRN
Status: DISCONTINUED | OUTPATIENT
Start: 2024-03-07 | End: 2024-03-07 | Stop reason: SURG

## 2024-03-07 RX ORDER — ALBUMIN, HUMAN INJ 5% 5 %
SOLUTION INTRAVENOUS CONTINUOUS PRN
Status: DISCONTINUED | OUTPATIENT
Start: 2024-03-07 | End: 2024-03-07 | Stop reason: SURG

## 2024-03-07 RX ORDER — ROCURONIUM BROMIDE 10 MG/ML
INJECTION, SOLUTION INTRAVENOUS AS NEEDED
Status: DISCONTINUED | OUTPATIENT
Start: 2024-03-07 | End: 2024-03-07 | Stop reason: SURG

## 2024-03-07 RX ORDER — ALBUTEROL SULFATE 2.5 MG/3ML
2.5 SOLUTION RESPIRATORY (INHALATION) EVERY 4 HOURS PRN
Status: DISCONTINUED | OUTPATIENT
Start: 2024-03-07 | End: 2024-03-08 | Stop reason: HOSPADM

## 2024-03-07 RX ORDER — MIDAZOLAM HYDROCHLORIDE 1 MG/ML
0.5 INJECTION INTRAMUSCULAR; INTRAVENOUS
Status: DISCONTINUED | OUTPATIENT
Start: 2024-03-07 | End: 2024-03-07 | Stop reason: HOSPADM

## 2024-03-07 RX ORDER — ONDANSETRON 2 MG/ML
INJECTION INTRAMUSCULAR; INTRAVENOUS AS NEEDED
Status: DISCONTINUED | OUTPATIENT
Start: 2024-03-07 | End: 2024-03-07 | Stop reason: SURG

## 2024-03-07 RX ORDER — MEPERIDINE HYDROCHLORIDE 25 MG/ML
12.5 INJECTION INTRAMUSCULAR; INTRAVENOUS; SUBCUTANEOUS
Status: DISCONTINUED | OUTPATIENT
Start: 2024-03-07 | End: 2024-03-07 | Stop reason: HOSPADM

## 2024-03-07 RX ORDER — NALOXONE HCL 0.4 MG/ML
0.4 VIAL (ML) INJECTION AS NEEDED
Status: DISCONTINUED | OUTPATIENT
Start: 2024-03-07 | End: 2024-03-07 | Stop reason: HOSPADM

## 2024-03-07 RX ORDER — SODIUM CHLORIDE, SODIUM LACTATE, POTASSIUM CHLORIDE, CALCIUM CHLORIDE 600; 310; 30; 20 MG/100ML; MG/100ML; MG/100ML; MG/100ML
9 INJECTION, SOLUTION INTRAVENOUS CONTINUOUS PRN
Status: DISCONTINUED | OUTPATIENT
Start: 2024-03-07 | End: 2024-03-07 | Stop reason: HOSPADM

## 2024-03-07 RX ORDER — FAMOTIDINE 10 MG/ML
20 INJECTION, SOLUTION INTRAVENOUS ONCE
Status: DISCONTINUED | OUTPATIENT
Start: 2024-03-07 | End: 2024-03-07 | Stop reason: HOSPADM

## 2024-03-07 RX ORDER — SODIUM CHLORIDE, SODIUM LACTATE, POTASSIUM CHLORIDE, CALCIUM CHLORIDE 600; 310; 30; 20 MG/100ML; MG/100ML; MG/100ML; MG/100ML
9 INJECTION, SOLUTION INTRAVENOUS CONTINUOUS
Status: DISCONTINUED | OUTPATIENT
Start: 2024-03-07 | End: 2024-03-07

## 2024-03-07 RX ORDER — PROMETHAZINE HYDROCHLORIDE 6.25 MG/5ML
12.5 SYRUP ORAL EVERY 6 HOURS PRN
Status: DISCONTINUED | OUTPATIENT
Start: 2024-03-07 | End: 2024-03-08 | Stop reason: HOSPADM

## 2024-03-07 RX ORDER — ONDANSETRON 2 MG/ML
4 INJECTION INTRAMUSCULAR; INTRAVENOUS ONCE AS NEEDED
Status: DISCONTINUED | OUTPATIENT
Start: 2024-03-07 | End: 2024-03-07 | Stop reason: HOSPADM

## 2024-03-07 RX ORDER — SIMETHICONE 80 MG
80 TABLET,CHEWABLE ORAL 4 TIMES DAILY PRN
Status: DISCONTINUED | OUTPATIENT
Start: 2024-03-07 | End: 2024-03-08 | Stop reason: HOSPADM

## 2024-03-07 RX ORDER — ANASTROZOLE 1 MG/1
1 TABLET ORAL DAILY
Status: DISCONTINUED | OUTPATIENT
Start: 2024-03-07 | End: 2024-03-08 | Stop reason: HOSPADM

## 2024-03-07 RX ORDER — HYDROMORPHONE HCL/0.9% NACL/PF 10 MG/50ML
PATIENT CONTROLLED ANALGESIA SYRINGE INTRAVENOUS CONTINUOUS
Status: DISCONTINUED | OUTPATIENT
Start: 2024-03-07 | End: 2024-03-08

## 2024-03-07 RX ORDER — FENTANYL CITRATE 50 UG/ML
INJECTION, SOLUTION INTRAMUSCULAR; INTRAVENOUS
Status: COMPLETED
Start: 2024-03-07 | End: 2024-03-07

## 2024-03-07 RX ORDER — LIDOCAINE HYDROCHLORIDE 10 MG/ML
0.5 INJECTION, SOLUTION EPIDURAL; INFILTRATION; INTRACAUDAL; PERINEURAL ONCE AS NEEDED
Status: COMPLETED | OUTPATIENT
Start: 2024-03-07 | End: 2024-03-07

## 2024-03-07 RX ORDER — NADOLOL 20 MG/1
60 TABLET ORAL NIGHTLY
Status: DISCONTINUED | OUTPATIENT
Start: 2024-03-07 | End: 2024-03-08 | Stop reason: HOSPADM

## 2024-03-07 RX ORDER — PROMETHAZINE HYDROCHLORIDE 25 MG/1
25 TABLET ORAL ONCE AS NEEDED
Status: DISCONTINUED | OUTPATIENT
Start: 2024-03-07 | End: 2024-03-07 | Stop reason: HOSPADM

## 2024-03-07 RX ORDER — GLYCOPYRROLATE 0.2 MG/ML
INJECTION INTRAMUSCULAR; INTRAVENOUS AS NEEDED
Status: DISCONTINUED | OUTPATIENT
Start: 2024-03-07 | End: 2024-03-07 | Stop reason: SURG

## 2024-03-07 RX ORDER — DIPHENHYDRAMINE HYDROCHLORIDE 50 MG/ML
25 INJECTION INTRAMUSCULAR; INTRAVENOUS EVERY 6 HOURS PRN
Status: DISCONTINUED | OUTPATIENT
Start: 2024-03-07 | End: 2024-03-08 | Stop reason: HOSPADM

## 2024-03-07 RX ORDER — FENTANYL CITRATE 50 UG/ML
INJECTION, SOLUTION INTRAMUSCULAR; INTRAVENOUS AS NEEDED
Status: DISCONTINUED | OUTPATIENT
Start: 2024-03-07 | End: 2024-03-07 | Stop reason: SURG

## 2024-03-07 RX ORDER — EPHEDRINE SULFATE 50 MG/ML
INJECTION INTRAVENOUS AS NEEDED
Status: DISCONTINUED | OUTPATIENT
Start: 2024-03-07 | End: 2024-03-07 | Stop reason: SURG

## 2024-03-07 RX ORDER — CETIRIZINE HYDROCHLORIDE 10 MG/1
10 TABLET ORAL DAILY
Status: DISCONTINUED | OUTPATIENT
Start: 2024-03-07 | End: 2024-03-08 | Stop reason: HOSPADM

## 2024-03-07 RX ADMIN — ALBUMIN (HUMAN): 12.5 INJECTION, SOLUTION INTRAVENOUS at 08:27

## 2024-03-07 RX ADMIN — ONDANSETRON 4 MG: 2 INJECTION INTRAMUSCULAR; INTRAVENOUS at 09:09

## 2024-03-07 RX ADMIN — SODIUM CHLORIDE 2000 MG: 900 INJECTION INTRAVENOUS at 07:40

## 2024-03-07 RX ADMIN — HYDROMORPHONE HYDROCHLORIDE 0.5 MG: 1 INJECTION, SOLUTION INTRAMUSCULAR; INTRAVENOUS; SUBCUTANEOUS at 09:43

## 2024-03-07 RX ADMIN — PROPOFOL 25 MCG/KG/MIN: 10 INJECTION, EMULSION INTRAVENOUS at 08:02

## 2024-03-07 RX ADMIN — SCOPOLAMINE 1 PATCH: 1.5 PATCH, EXTENDED RELEASE TRANSDERMAL at 07:20

## 2024-03-07 RX ADMIN — ATORVASTATIN CALCIUM 20 MG: 20 TABLET, FILM COATED ORAL at 21:41

## 2024-03-07 RX ADMIN — Medication 100 MCG: at 08:18

## 2024-03-07 RX ADMIN — SODIUM CHLORIDE, POTASSIUM CHLORIDE, SODIUM LACTATE AND CALCIUM CHLORIDE 75 ML/HR: 600; 310; 30; 20 INJECTION, SOLUTION INTRAVENOUS at 21:42

## 2024-03-07 RX ADMIN — LIDOCAINE HYDROCHLORIDE 0.5 ML: 10 INJECTION, SOLUTION EPIDURAL; INFILTRATION; INTRACAUDAL; PERINEURAL at 06:46

## 2024-03-07 RX ADMIN — FENTANYL CITRATE 50 MCG: 50 INJECTION, SOLUTION INTRAMUSCULAR; INTRAVENOUS at 09:38

## 2024-03-07 RX ADMIN — GLYCOPYRROLATE 0.2 MG: 0.4 INJECTION INTRAMUSCULAR; INTRAVENOUS at 07:47

## 2024-03-07 RX ADMIN — NADOLOL 60 MG: 20 TABLET ORAL at 21:41

## 2024-03-07 RX ADMIN — SODIUM CHLORIDE, POTASSIUM CHLORIDE, SODIUM LACTATE AND CALCIUM CHLORIDE 9 ML/HR: 600; 310; 30; 20 INJECTION, SOLUTION INTRAVENOUS at 06:46

## 2024-03-07 RX ADMIN — Medication 50 MCG: at 08:11

## 2024-03-07 RX ADMIN — SUGAMMADEX 200 MG: 100 INJECTION, SOLUTION INTRAVENOUS at 09:13

## 2024-03-07 RX ADMIN — EPHEDRINE SULFATE 10 MG: 50 INJECTION INTRAVENOUS at 07:43

## 2024-03-07 RX ADMIN — Medication 100 MCG: at 07:55

## 2024-03-07 RX ADMIN — ANASTROZOLE 1 MG: 1 TABLET ORAL at 18:59

## 2024-03-07 RX ADMIN — GLYCOPYRROLATE 0.1 MG: 0.4 INJECTION INTRAMUSCULAR; INTRAVENOUS at 07:49

## 2024-03-07 RX ADMIN — Medication 100 MCG: at 07:45

## 2024-03-07 RX ADMIN — Medication: at 09:43

## 2024-03-07 RX ADMIN — DEXAMETHASONE SODIUM PHOSPHATE 4 MG: 4 INJECTION INTRA-ARTICULAR; INTRALESIONAL; INTRAMUSCULAR; INTRAVENOUS; SOFT TISSUE at 07:40

## 2024-03-07 RX ADMIN — Medication 100 MCG: at 08:37

## 2024-03-07 RX ADMIN — EPHEDRINE SULFATE 10 MG: 50 INJECTION INTRAVENOUS at 08:38

## 2024-03-07 RX ADMIN — FENTANYL CITRATE 100 MCG: 50 INJECTION, SOLUTION INTRAMUSCULAR; INTRAVENOUS at 07:36

## 2024-03-07 RX ADMIN — HYDROMORPHONE HYDROCHLORIDE: 10 INJECTION, SOLUTION INTRAMUSCULAR; INTRAVENOUS; SUBCUTANEOUS at 09:43

## 2024-03-07 RX ADMIN — Medication 100 MCG: at 08:22

## 2024-03-07 RX ADMIN — PROPOFOL 150 MG: 10 INJECTION, EMULSION INTRAVENOUS at 07:36

## 2024-03-07 RX ADMIN — ROCURONIUM BROMIDE 10 MG: 10 SOLUTION INTRAVENOUS at 08:25

## 2024-03-07 RX ADMIN — LIDOCAINE HYDROCHLORIDE 50 MG: 10 INJECTION, SOLUTION EPIDURAL; INFILTRATION; INTRACAUDAL; PERINEURAL at 07:36

## 2024-03-07 RX ADMIN — EPHEDRINE SULFATE 10 MG: 50 INJECTION INTRAVENOUS at 07:53

## 2024-03-07 RX ADMIN — PROMETHAZINE HYDROCHLORIDE 12.5 MG: 6.25 SOLUTION ORAL at 16:26

## 2024-03-07 RX ADMIN — EPHEDRINE SULFATE 10 MG: 50 INJECTION INTRAVENOUS at 08:16

## 2024-03-07 RX ADMIN — FAMOTIDINE 20 MG: 20 TABLET, FILM COATED ORAL at 06:47

## 2024-03-07 RX ADMIN — ONDANSETRON 4 MG: 2 INJECTION INTRAMUSCULAR; INTRAVENOUS at 11:52

## 2024-03-07 RX ADMIN — ROCURONIUM BROMIDE 50 MG: 10 SOLUTION INTRAVENOUS at 07:36

## 2024-03-07 NOTE — OP NOTE
OPERATIVE NOTE    Patient Name:  Delilah Hensley  YOB: 1960  [id number]    3/7/2024        PREOPERATIVE DIAGNOSIS: Paraesophageal hernia without  obstruction        POSTOPERATIVE DIAGNOSIS: Same         PROCEDURE PERFORMED:    Laparoscopic Paraesophageal Hernia Repair, Toupet fundoplication with mesh, EGD with PEG placement          SURGEON: Gama Tom MD       ASSISTANT:  Tonia Avila PA-C. Tonia Avila PA-C   was responsible for performing the following activities: Retraction, Closing, Placing Dressing, and Held/Positioned Camera and their skilled assistance was necessary for the success of this case.           SPECIMENS: Hernia sac          ANESTHESIA: General.     EBL: Minimal          FINDINGS:   1.  Type III paraesophageal hernia  completely reduced and repaired with a posterior hiatal cruroplasty, and reinforced with a piece of Phasix ST  mesh    2.  Toupet fundoplication performed around 50 Chinese bougie    3. 20 Fr PEG at the 4 cm level         INDICATIONS:    Delilah Hensley is a very pleasant 63 y.o. female with a history of reflux disease and paraesophageal hernia  . After a complete preoperative workup they were deemed an operative candidate. The risks and benefits were discussed at length with the patient and their family and they agreed to proceed.         DESCRIPTION OF PROCEDURE:      After obtaining informed consent, the patient was taken to the operating room and placed in supine position. After appropriate DVT and antibiotic prophylaxis, general anesthesia was induced. The abdomen was prepped and draped in standard sterile fashion.  After infiltrating the skin with local anesthetic a 12 mm skin incision was made approximately 10 cm from xiphoid process along the left costal margin. An optically guided trocar was then advanced through the abdominal wall into the abdominal cavity without difficulty. The abdomen was insufflated with carbon dioxide gas to a pressure of 15  mmHg. The laparoscope was then advanced through the trocar and the abdominal contents inspected. There was no evidence of bowel bladder or visceral injury with entry of the trocar. At this point after infiltrating the appropriate areas with local anesthetic a standard laparoscopic Nissen fundoplication port placement schema was followed. A liver retractor was used to retract the liver anteriorly.     Using a combination of electrocautery and ultrasonic dissection the greater curvature of the stomach was mobilized up to the left angel. The pars flaccida was then divided superior and inferior to the hepatic branch of the vagus nerve which was spared throughout the procedure. The anterior surface of the right angel was then scored and using meticulous blunt dissection a retroesophageal window to the left side was created. A Penrose was placed through this and used to encircle the GE junction. A circumferential mobilization of the GE junction from the hiatus was performed using a combination of electrocautery ultrasonic and blunt dissection. This dissection was carried up into the mediastinum to the level of the aortic arch. The anterior and posterior vagus nerves were identified and spared throughout the procedure.  The right and left pleural spaces were identified and spared throughout the procedure. After complete mobilization the hernia sac was taken off the anterior surface of the stomach using ultrasonic dissection and passed off as specimen  .     A posterior hiatal cruroplasty was then performed using pledgeted 0 silk sutures. This created a snug closure of the hiatus around the esophagus. A piece of Phasix ST  mesh was then placed posteriorly, and tacked to the diaphragm using silk sutures. The anesthetist advanced a 50 Greenlandic orogastric bougie under direct visualization into the stomach. Around this bougie at the level of the GE junction a Toupet fundoplication was then performed using silk sutures. At the  "completion of the fundoplication, the bougie was removed, as was the Penrose drain, which was discarded.     An endoscope was then advanced through the mouth and the esophagus into the stomach under direct visualization. The GE junction was visualized within the fundoplication. Under maximal insufflation a retroflexed view of the GE junction was appreciated. This demonstrated an excellent \"stack of coins\" appearance to the fundoplication with good apposition of the GE junction around the scope.     At this point an appropriate site for PEG placement was determined by palpation transillumination and the safe track needle technique.  After infiltrating the skin with local anesthetic a 7 mm skin incision was made in this location.  A needle was advanced through the incision into the stomach under direct laparoscopic visualization.  A guidewire was placed through the needle and grasped with the endoscope and brought through the patient's mouth.  Using the Ponsky pull technique, a 20 Kittitian PEG tube was brought through the abdominal wall in this location.  The endoscope was then advanced back through the mouth and esophagus into the stomach where the PEG bumper could be seen abutting the anterior gastric wall in standard fashion without evidence of bleeding.  The endoscope was then used to desufflate the stomach and removed from the patient's mouth without difficulty.     The liver retractor was then removed. All trocars were then removed under direct and laparoscopic visualization and the abdomen desufflated. The incisions were then closed using running subcuticular sutures and dressed with dry sterile dressings. The PEG tube was secured to the skin at the 4 cm level, and the bumper at the 4.5 cm level. It was dressed in standard sterile fashion and placed to gravity drainage.      All sponge and needle counts were correct times two at the completion of the procedure. The patient recovered from anesthesia, was extubated " in the operating room and was transported to the PACU in stable condition.    COMPLICATIONS: None             Gama Tom MD  3/7/2024  09:22 EST

## 2024-03-07 NOTE — PROGRESS NOTES
"Patient Name:  Delilah Hensley  YOB: 1960  [id number]    Surgery Post - Operative Note    Date of visit: 3/7/2024    Subjective   Subjective: Feels OK, some shoulder pain.       Objective     Objective:    /94 (BP Location: Left arm, Patient Position: Lying)   Pulse 63   Temp 97.3 °F (36.3 °C) (Temporal)   Resp 16   Ht 170.2 cm (67.01\")   Wt 70.5 kg (155 lb 6.8 oz)   SpO2 92%   BMI 24.34 kg/m²     CV:  Rate  regular and rhythm  regular  L:  Clear  to auscultation bilaterally   ABD:  Soft, appropriately tender. Dressings and PEG clean, dry and intact   EXT:  No cyanosis, clubbing or edema             Assessment/ Plan: Doing well after Lap PEH repair. Continue Pulmonary toilet    Problem List Items Addressed This Visit          Gastrointestinal Abdominal     * (Principal) Paraesophageal hernia    Relevant Medications    famotidine (PEPCID) tablet 20 mg (Completed)    pantoprazole (PROTONIX) EC tablet 40 mg    Other Relevant Orders    Tissue Pathology Exam        Active Hospital Problems    Diagnosis  POA    **Paraesophageal hernia [K44.9]  Yes    Chronic obstructive lung disease [J44.9]  Yes    Cirrhosis of liver [K74.60]  Yes    Peripheral neuropathy due to chemotherapy [G62.0, T45.1X5A]  Yes      Resolved Hospital Problems   No resolved problems to display.            Gama Tom MD  3/7/2024  14:26 EST    "

## 2024-03-07 NOTE — ANESTHESIA PROCEDURE NOTES
Airway  Urgency: elective    Date/Time: 3/7/2024 7:38 AM  Airway not difficult    General Information and Staff    Patient location during procedure: OR  CRNA/CAA: Maco Posada CRNA  SRNA: Agustin Blue SRNA  Indications and Patient Condition  Indications for airway management: airway protection    Preoxygenated: yes  MILS not maintained throughout  Mask difficulty assessment: 1 - vent by mask    Final Airway Details  Final airway type: endotracheal airway      Successful airway: ETT  Cuffed: yes   Successful intubation technique: direct laryngoscopy  Endotracheal tube insertion site: oral  Blade: Morales  Blade size: 3  ETT size (mm): 7.0  Cormack-Lehane Classification: grade I - full view of glottis  Placement verified by: chest auscultation and capnometry   Cuff volume (mL): 8  Measured from: lips  ETT/EBT  to lips (cm): 20  Number of attempts at approach: 1  Assessment: lips, teeth, and gum same as pre-op and atraumatic intubation    Additional Comments  Negative epigastric sounds, Breath sound equal bilaterally with symmetric chest rise and fall

## 2024-03-07 NOTE — ANESTHESIA POSTPROCEDURE EVALUATION
Patient: Delilah Hensley    Procedure Summary       Date: 03/07/24 Room / Location:  [facility]    Anesthesia Start: 0730 Anesthesia Stop: 0933    Procedure: PARAESOPHAGEAL HERNIA REPAIR LAPAROSCOPIC WITH MESH, TOUPET, EGD/PEG (Abdomen) Diagnosis:     Surgeons: Gama Tom MD Provider: Jair Aguayo MD    Anesthesia Type: general ASA Status: 3            Anesthesia Type: general    Vitals  Vitals Value Taken Time   /70 03/07/24 1030   Temp 98 °F (36.7 °C) 03/07/24 1030   Pulse 54 03/07/24 1038   Resp 16 03/07/24 1030   SpO2 97 % 03/07/24 1038   Vitals shown include unfiled device data.        Post Anesthesia Care and Evaluation    Patient location during evaluation: PACU  Patient participation: complete - patient participated  Level of consciousness: awake and alert  Pain management: adequate    Airway patency: patent  Anesthetic complications: No anesthetic complications  PONV Status: none  Cardiovascular status: hemodynamically stable and acceptable  Respiratory status: nonlabored ventilation, acceptable and nasal cannula  Hydration status: acceptable

## 2024-03-07 NOTE — ANESTHESIA PREPROCEDURE EVALUATION
Anesthesia Evaluation     Patient summary reviewed and Nursing notes reviewed   no history of anesthetic complications:   NPO Solid Status: > 8 hours  NPO Liquid Status: > 8 hours           Airway   Mallampati: II  TM distance: >3 FB  Neck ROM: full  No difficulty expected  Dental      Pulmonary - normal exam   (+) asthma,shortness of breath  Cardiovascular - normal exam        Neuro/Psych  (+) numbness  GI/Hepatic/Renal/Endo    (+) hiatal hernia, GERD, diabetes mellitus, thyroid problem     Musculoskeletal     Abdominal    Substance History      OB/GYN          Other      history of cancer                Anesthesia Plan    ASA 3     general     intravenous induction     Anesthetic plan, risks, benefits, and alternatives have been provided, discussed and informed consent has been obtained with: patient.    Plan discussed with CRNA.    CODE STATUS:

## 2024-03-07 NOTE — BRIEF OP NOTE
PARAESOPHAGEAL HERNIA REPAIR LAPAROSCOPIC  Progress Note    Delilah Blackman Ayden  3/7/2024    Pre-op Diagnosis:   Paraesophageal hernia       Post-Op Diagnosis Codes:  Same    Procedure/CPT® Codes:        Procedure(s):  PARAESOPHAGEAL HERNIA REPAIR LAPAROSCOPIC WITH MESH, TOUPET, EGD/PEG              Surgeon(s):  Gama Tom MD    Anesthesia: General    Staff:   Circulator: Felicia Tyson RN  Scrub Person: Fallon Chin  Nursing Assistant: Julienne Garza PCT         Estimated Blood Loss: 10 mL    Urine Voided: * No values recorded between 3/7/2024  7:30 AM and 3/7/2024  9:19 AM *    Specimens:                Specimens       ID Source Type Tests Collected By Collected At Frozen?    A Hernia, Sac Tissue TISSUE PATHOLOGY EXAM   Gama Tom MD 3/7/24 0824     Description: hernia sac for permanent                  Drains:   Gastrostomy/Enterostomy PEG-jejunostomy 1 20 Fr. (Active)       Findings:   Type III paraesophageal hernia with intrathoracic stomach completely reduced and repaired with a posterior hiatal cruroplasty, and reinforced with Phasix ST mesh  Toupet fundoplication performed around 50 Omani bougie  20 O[place]i PEG tube placed with the skin at the 4 cm level        Complications: None          Gama Tom MD     Date: 3/7/2024  Time: 09:20 EST

## 2024-03-07 NOTE — ANESTHESIA POSTPROCEDURE EVALUATION
Patient: Delilah Hensley    Procedure Summary       Date: 03/07/24 Room / Location:  [facility]    Anesthesia Start: 0730 Anesthesia Stop: 0933    Procedure: PARAESOPHAGEAL HERNIA REPAIR LAPAROSCOPIC WITH MESH, TOUPET, EGD/PEG (Abdomen) Diagnosis:     Surgeons: Gama Tom MD Provider: Jair Aguayo MD    Anesthesia Type: general ASA Status: 3            Anesthesia Type: general    Vitals  Vitals Value Taken Time   /75    Temp 98    Pulse 62 03/07/24 0932   Resp 17    SpO2 100 % 03/07/24 0932   Vitals shown include unfiled device data.        Post Anesthesia Care and Evaluation    Patient location during evaluation: PACU  Patient participation: complete - patient participated  Level of consciousness: sleepy but conscious  Pain management: adequate    Airway patency: patent  Anesthetic complications: No anesthetic complications  PONV Status: none  Cardiovascular status: hemodynamically stable and acceptable  Respiratory status: nonlabored ventilation, acceptable and nasal cannula  Hydration status: acceptable

## 2024-03-07 NOTE — H&P
Pre-Op H&P  Deillah Hensley  [id number]  1960      Chief complaint: epigastric pain      Subjective:  Patient is a 63 y.o.female presents for scheduled surgery by Dr. Tom. She anticipates a PARAESOPHAGEAL HERNIA REPAIR LAPAROSCOPIC WITH MESH, TOUPET, EGD/PEG today. She reports worsening acid reflux and epigastric pain after eating over the last  couple years. She has chronic cough. She denies regurgitation or dysphagia. She had abd CT scan that showed large PEH without obstruction.       Review of Systems:  Constitutional-- No fever, chills or sweats. No fatigue.  CV-- No chest pain, palpitation or syncope. +HLD  Resp-- No SOB, cough, hemoptysis  Skin--No rashes or lesions      Allergies:   Allergies   Allergen Reactions    Ibuprofen Swelling         Home Meds:  Medications Prior to Admission   Medication Sig Dispense Refill Last Dose    anastrozole (ARIMIDEX) 1 MG tablet Take 1 tablet by mouth Daily. 90 tablet 3 3/6/2024    anastrozole (ARIMIDEX) 1 MG tablet TAKE 1 TABLET EVERY DAY 90 tablet 3 3/6/2024    cetirizine (zyrTEC) 10 MG tablet Take 1 tablet by mouth Daily.   Past Month    fluticasone (VERAMYST) 27.5 MCG/SPRAY nasal spray 1 spray into the nostril(s) as directed by provider Every Night.   3/6/2024    guaiFENesin (MUCINEX) 600 MG 12 hr tablet Take 300 mg by mouth 2 (Two) Times a Day.   3/6/2024    levothyroxine (SYNTHROID, LEVOTHROID) 125 MCG tablet Take 75 mcg by mouth Daily.   3/6/2024    levothyroxine (SYNTHROID, LEVOTHROID) 88 MCG tablet    3/6/2024    metFORMIN ER (GLUCOPHAGE-XR) 500 MG 24 hr tablet Take 2 tablets by mouth Daily With Breakfast.   3/6/2024    multivitamin with minerals (MULTIVITAMIN ADULT PO) Take 1 tablet by mouth Daily.   3/6/2024    nadolol (CORGARD) 20 MG tablet Take 3 tablets by mouth Every Evening.  6 3/6/2024 at 2100    omeprazole (priLOSEC) 40 MG capsule Take 1 capsule by mouth Daily.   3/6/2024    VENTOLIN  (90 BASE) MCG/ACT inhaler Inhale 1 puff Every 4  "(Four) Hours As Needed for Wheezing or Shortness of Air.   Past Week    atorvastatin (LIPITOR) 20 MG tablet Take 1 tablet by mouth Every Evening.   More than a month         PMH:   Past Medical History:   Diagnosis Date    Anemia     Asthma     BRCA2 positive     Diabetes mellitus     Disease of thyroid gland     Esophageal varices     GERD (gastroesophageal reflux disease)     Hiatal hernia     Malignant neoplasm of upper outer quadrant of female breast     LEFT    Menopause     Peripheral neuropathy     Wears glasses      PSH:    Past Surgical History:   Procedure Laterality Date    CARPAL TUNNEL RELEASE      MASTECTOMY Bilateral 2016    TOTAL LAPAROSCOPIC HYSTERECTOMY N/A 2016    RATLH/BSO    VENOUS ACCESS DEVICE (PORT) INSERTION AND REMOVAL Right        Immunization History:  Influenza: UTD  Pneumococcal: UTD  Tetanus: UTD  Covid : x2    Social History:   Tobacco:   Social History     Tobacco Use   Smoking Status Former    Current packs/day: 0.00    Types: Cigarettes    Quit date:     Years since quittin.2   Smokeless Tobacco Never   Tobacco Comments    2-3 CIGS PER DAY      Alcohol:     Social History     Substance and Sexual Activity   Alcohol Use No         Physical Exam:/77 (BP Location: Right arm, Patient Position: Lying)   Pulse 79   Temp 97.9 °F (36.6 °C) (Temporal)   Resp 18   Ht 170.2 cm (67.01\")   Wt 70.5 kg (155 lb 6.8 oz)   SpO2 98%   BMI 24.34 kg/m²       General Appearance:    Alert, cooperative, no distress, appears stated age   Head:    Normocephalic, without obvious abnormality, atraumatic   Lungs:     Clear to auscultation bilaterally, respirations unlabored    Heart:   Regular rate and rhythm, S1 and S2 normal    Abdomen:    Soft without tenderness   Extremities:   Extremities normal, atraumatic, no cyanosis or edema   Skin:   Skin color, texture, turgor normal, no rashes or lesions   Neurologic:   Grossly intact     Results Review:     LABS:  Lab Results   Component " Value Date    WBC 9.31 03/01/2024    HGB 13.0 03/01/2024    HCT 40.5 03/01/2024    .5 (H) 03/01/2024     03/01/2024    NEUTROABS 7.26 11/02/2016    GLUCOSE 137 (H) 03/01/2024    BUN 11 03/01/2024    CREATININE 0.73 03/01/2024    EGFRIFNONA 64 11/02/2016     03/01/2024    K 4.2 03/01/2024    CL 97 (L) 03/01/2024    CO2 26.0 03/01/2024    CALCIUM 10.0 03/01/2024    ALBUMIN 5.2 03/01/2024    AST 20 03/01/2024    ALT 15 03/01/2024    BILITOT 0.7 03/01/2024       RADIOLOGY:  Imaging Results (Last 72 Hours)       ** No results found for the last 72 hours. **            I reviewed the patient's new clinical results.    Cancer Staging (if applicable)  Cancer Patient: __ yes __no __unknown; If yes, clinical stage T:__ N:__M:__, stage group or __N/A      Impression: Diaphragmatic hernia without obstruction       Plan: PARAESOPHAGEAL HERNIA REPAIR LAPAROSCOPIC WITH MESH, TOUPET, EGD/PEG      Jory Bowens, FAVIOLA   3/7/2024   06:49 EST

## 2024-03-08 ENCOUNTER — APPOINTMENT (OUTPATIENT)
Dept: GENERAL RADIOLOGY | Facility: HOSPITAL | Age: 64
End: 2024-03-08
Payer: MEDICARE

## 2024-03-08 VITALS
RESPIRATION RATE: 18 BRPM | SYSTOLIC BLOOD PRESSURE: 115 MMHG | HEART RATE: 55 BPM | BODY MASS INDEX: 24.39 KG/M2 | WEIGHT: 155.42 LBS | DIASTOLIC BLOOD PRESSURE: 72 MMHG | HEIGHT: 67 IN | TEMPERATURE: 98.5 F | OXYGEN SATURATION: 91 %

## 2024-03-08 LAB
ANION GAP SERPL CALCULATED.3IONS-SCNC: 13 MMOL/L (ref 5–15)
BASOPHILS # BLD AUTO: 0.04 10*3/MM3 (ref 0–0.2)
BASOPHILS NFR BLD AUTO: 0.3 % (ref 0–1.5)
BUN SERPL-MCNC: 11 MG/DL (ref 8–23)
BUN/CREAT SERPL: 16.7 (ref 7–25)
CALCIUM SPEC-SCNC: 9.1 MG/DL (ref 8.6–10.5)
CHLORIDE SERPL-SCNC: 95 MMOL/L (ref 98–107)
CO2 SERPL-SCNC: 23 MMOL/L (ref 22–29)
CREAT SERPL-MCNC: 0.66 MG/DL (ref 0.57–1)
CYTO UR: NORMAL
DEPRECATED RDW RBC AUTO: 48.4 FL (ref 37–54)
EGFRCR SERPLBLD CKD-EPI 2021: 97.5 ML/MIN/1.73
EOSINOPHIL # BLD AUTO: 0.03 10*3/MM3 (ref 0–0.4)
EOSINOPHIL NFR BLD AUTO: 0.2 % (ref 0.3–6.2)
ERYTHROCYTE [DISTWIDTH] IN BLOOD BY AUTOMATED COUNT: 13 % (ref 12.3–15.4)
GLUCOSE SERPL-MCNC: 116 MG/DL (ref 65–99)
HCT VFR BLD AUTO: 36.6 % (ref 34–46.6)
HGB BLD-MCNC: 12 G/DL (ref 12–15.9)
IMM GRANULOCYTES # BLD AUTO: 0.09 10*3/MM3 (ref 0–0.05)
IMM GRANULOCYTES NFR BLD AUTO: 0.6 % (ref 0–0.5)
LAB AP CASE REPORT: NORMAL
LAB AP CLINICAL INFORMATION: NORMAL
LYMPHOCYTES # BLD AUTO: 2.15 10*3/MM3 (ref 0.7–3.1)
LYMPHOCYTES NFR BLD AUTO: 13.8 % (ref 19.6–45.3)
MCH RBC QN AUTO: 33.2 PG (ref 26.6–33)
MCHC RBC AUTO-ENTMCNC: 32.8 G/DL (ref 31.5–35.7)
MCV RBC AUTO: 101.4 FL (ref 79–97)
MONOCYTES # BLD AUTO: 1.42 10*3/MM3 (ref 0.1–0.9)
MONOCYTES NFR BLD AUTO: 9.1 % (ref 5–12)
NEUTROPHILS NFR BLD AUTO: 11.8 10*3/MM3 (ref 1.7–7)
NEUTROPHILS NFR BLD AUTO: 76 % (ref 42.7–76)
NRBC BLD AUTO-RTO: 0 /100 WBC (ref 0–0.2)
PATH REPORT.FINAL DX SPEC: NORMAL
PATH REPORT.GROSS SPEC: NORMAL
PLATELET # BLD AUTO: 226 10*3/MM3 (ref 140–450)
PMV BLD AUTO: 11 FL (ref 6–12)
POTASSIUM SERPL-SCNC: 3.7 MMOL/L (ref 3.5–5.2)
RBC # BLD AUTO: 3.61 10*6/MM3 (ref 3.77–5.28)
SODIUM SERPL-SCNC: 131 MMOL/L (ref 136–145)
WBC NRBC COR # BLD AUTO: 15.53 10*3/MM3 (ref 3.4–10.8)

## 2024-03-08 PROCEDURE — 63710000001 PROMETHAZINE PER 25 MG: Performed by: SURGERY

## 2024-03-08 PROCEDURE — 74240 X-RAY XM UPR GI TRC 1CNTRST: CPT

## 2024-03-08 PROCEDURE — 25010000002 HEPARIN (PORCINE) PER 1000 UNITS: Performed by: SURGERY

## 2024-03-08 PROCEDURE — 0 DIATRIZOATE MEGLUMINE & SODIUM PER 1 ML: Performed by: PHYSICIAN ASSISTANT

## 2024-03-08 PROCEDURE — 80048 BASIC METABOLIC PNL TOTAL CA: CPT | Performed by: SURGERY

## 2024-03-08 PROCEDURE — 85025 COMPLETE CBC W/AUTO DIFF WBC: CPT | Performed by: SURGERY

## 2024-03-08 RX ORDER — ONDANSETRON 4 MG/1
4 TABLET, ORALLY DISINTEGRATING ORAL EVERY 6 HOURS PRN
Qty: 60 TABLET | Refills: 1 | Status: SHIPPED | OUTPATIENT
Start: 2024-03-08

## 2024-03-08 RX ORDER — SIMETHICONE 80 MG
80 TABLET,CHEWABLE ORAL 4 TIMES DAILY PRN
Qty: 60 TABLET | Refills: 2 | Status: SHIPPED | OUTPATIENT
Start: 2024-03-08

## 2024-03-08 RX ORDER — ACETAMINOPHEN 325 MG/1
650 TABLET ORAL EVERY 6 HOURS PRN
Status: DISCONTINUED | OUTPATIENT
Start: 2024-03-08 | End: 2024-03-08 | Stop reason: HOSPADM

## 2024-03-08 RX ORDER — DOCUSATE SODIUM 50 MG/5 ML
100 LIQUID (ML) ORAL DAILY
Qty: 200 ML | Refills: 0 | Status: SHIPPED | OUTPATIENT
Start: 2024-03-09

## 2024-03-08 RX ADMIN — HEPARIN SODIUM 5000 UNITS: 5000 INJECTION INTRAVENOUS; SUBCUTANEOUS at 06:29

## 2024-03-08 RX ADMIN — ANASTROZOLE 1 MG: 1 TABLET ORAL at 10:31

## 2024-03-08 RX ADMIN — CETIRIZINE HYDROCHLORIDE 10 MG: 10 TABLET, FILM COATED ORAL at 10:31

## 2024-03-08 RX ADMIN — DOCUSATE SODIUM 100 MG: 50 LIQUID ORAL at 10:32

## 2024-03-08 RX ADMIN — LEVOTHYROXINE SODIUM 75 MCG: 0.07 TABLET ORAL at 06:29

## 2024-03-08 RX ADMIN — ACETAMINOPHEN 650 MG: 325 TABLET, FILM COATED ORAL at 07:06

## 2024-03-08 RX ADMIN — GUAIFENESIN 600 MG: 600 TABLET, EXTENDED RELEASE ORAL at 10:31

## 2024-03-08 RX ADMIN — PROMETHAZINE HYDROCHLORIDE 12.5 MG: 6.25 SOLUTION ORAL at 06:29

## 2024-03-08 NOTE — CASE MANAGEMENT/SOCIAL WORK
Case Management Discharge Note      Final Note: Home with family transporting. No discharge needs         Selected Continued Care - Admitted Since 3/7/2024       Destination    No services have been selected for the patient.                Durable Medical Equipment    No services have been selected for the patient.                Dialysis/Infusion    No services have been selected for the patient.                Home Medical Care    No services have been selected for the patient.                Therapy    No services have been selected for the patient.                Community Resources    No services have been selected for the patient.                Community & DME    No services have been selected for the patient.                         Final Discharge Disposition Code: 01 - home or self-care

## 2024-03-08 NOTE — CONSULTS
Clinical Nutrition     Nutrition Education   Reason for Visit:   Physician Consult     Patient Name: Delilah Hensley  YOB: 1960  MRN: [id number]  Date of Encounter: 03/08/24 13:29 EST  Admission date: 3/7/2024       Assessment     Applicable diagnosis:  Patient Active Problem List   Diagnosis    Malignant neoplasm of upper-outer quadrant of left female breast    Peripheral neuropathy due to chemotherapy    BRCA2 positive    Hypoxemia    Anemia    Shortness of breath on exertion    History of left breast cancer    Chronic obstructive lung disease    Cirrhosis of liver    Paraesophageal hernia       Reported/Observed/Food/Nutrition Related History:     Diet education/counseling provided for s/p fundoplication procedure. We discussed diet progression, foods to avoid, methods to improve tolerance and prevent complications, nutrition needs and managing on restrictive diet, etc. Pt engaged asking appropriate questions throughout. Pt voiced understanding and ability to adhere to all recommendations. Pt to d/c.     Labs reviewed   Yes     Nutrition Diagnosis   Date:   Updated:   Problem Food and nutrition knowledge deficit   Etiology S/p fundoplication    Signs/Symptoms Pt with prior lack of knowledge MNT/appropriate diet for condition    Status:    Goal:     Increase knowledge on diet/nutrition effects on condition/status     Nutrition Intervention     Nutrition Education provided regarding: Diet rationale, Key food habit change, and Post Nissen Fundoplication      RD provided printed material via Diet After Nissen Fundoplication Surgery; Material developed by [facility] and Dr. Gama Tom     Monitoring/Evaluation:     Pt acknowledged understanding of material covered  RD to follow up LOKESH Swanson RD  Time Spent: 45m

## 2024-03-08 NOTE — PLAN OF CARE
Goal Outcome Evaluation:  Plan of Care Reviewed With: patient        Progress: improving  Outcome Evaluation: Pt rested intermittently throughout shift. Diludid PCA in place. Pt ambulating to the bathroom. Tolerating full liquid diet. VSS on RA. No other concerns noted at this time.

## 2024-03-08 NOTE — PLAN OF CARE
Goal Outcome Evaluation:                                            D/C instructions given to include: newly prescribed meds (indications, side effects); s/s infection; managing PEG tube and site care. IV removed. PEG site cleansed and split gauze changed. Follow up has already been scheduled and is in discharge instructions. Awaiting Meds to Bed

## 2024-03-08 NOTE — PROGRESS NOTES
"Patient Name:  Delilah Hensley  YOB: 1960  [id number]    Surgery Progress Note    Date of visit: 3/8/2024    Subjective   Subjective: Feeling OK. Complains of headache.         Objective     Objective:     /78 (BP Location: Right arm, Patient Position: Lying)   Pulse 58   Temp 97.7 °F (36.5 °C) (Temporal)   Resp 16   Ht 170.2 cm (67.01\")   Wt 70.5 kg (155 lb 6.8 oz)   SpO2 95%   BMI 24.34 kg/m²     Intake/Output Summary (Last 24 hours) at 3/8/2024 0703  Last data filed at 3/8/2024 0353  Gross per 24 hour   Intake 1050.7 ml   Output 1235 ml   Net -184.3 ml       CV:  Rhythm  regular and rate regular   L:  Clear  to auscultation bilaterally   Abd:  Bowel sounds positive , soft, appropriately tender. Dressings and PEG c/d/i  Ext:  No cyanosis, clubbing, edema    Recent labs that are back at this time have been reviewed.            Assessment/ Plan:    Problem List Items Addressed This Visit          Gastrointestinal Abdominal     * (Principal) Paraesophageal hernia- Doing well after Lap PEH repair. D/C PCA. UGI and teaching. Possibly home later today.      Relevant Medications    pantoprazole (PROTONIX) EC tablet 40 mg    Other Relevant Orders    Tissue Pathology Exam        Active Hospital Problems    Diagnosis  POA    **Paraesophageal hernia [K44.9]  Yes    Chronic obstructive lung disease [J44.9]  Yes    Cirrhosis of liver [K74.60]  Yes    Peripheral neuropathy due to chemotherapy [G62.0, T45.1X5A]  Yes      Resolved Hospital Problems   No resolved problems to display.              Gama Tom MD  3/8/2024  07:03 EST      Feels OK. UGI shows excellent technical result. Feels well, wants to go home.    Gama Tom MD  12:52 EST      "

## 2024-03-17 ENCOUNTER — NURSE TRIAGE (OUTPATIENT)
Dept: CALL CENTER | Facility: HOSPITAL | Age: 64
End: 2024-03-17
Payer: MEDICARE

## 2024-03-17 NOTE — TELEPHONE ENCOUNTER
Caller states had surgery and now has black tongue and sore gums. Caller states she heard a pop or something she states and thinking has an abscess or something. Caller reports pain four. Caller denies facial swelling. Caller denies fever but states she didn't think her thermometer was right. Caller states was seen in ER yesterday. Caller has number for surgeon and will call at this time.         Reason for Disposition   [1] Caller has URGENT question AND [2] triager unable to answer question    Additional Information   Negative: Sounds like a life-threatening emergency to the triager   Negative: Chest pain   Negative: Difficulty breathing   Negative: Acting confused (e.g., disoriented, slurred speech) or excessively sleepy   Negative: Post-Op tonsil and adenoid surgery, symptoms or questions about   Negative: Surgical incision symptoms and questions   Negative: [1] Pain or burning with passing urine (urination) AND [2] male   Negative: [1] Pain or burning with passing urine (urination) AND [2] female   Negative: Constipation   Negative: New or worsening leg (calf, thigh) pain   Negative: New or worsening leg swelling   Negative: Dizziness is severe, or persists > 24 hours after surgery   Negative: Pain, redness, swelling, or pus at IV Site   Negative: Symptoms arising from use of a urinary catheter (e.g., Coude, Delgado)   Negative: Cast problems or questions   Negative: Medication question   Negative: [1] Widespread rash AND [2] bright red, sunburn-like   Negative: [1] SEVERE headache AND [2] after spinal (epidural) anesthesia   Negative: [1] Vomiting AND [2] persists > 4 hours   Negative: [1] Vomiting AND [2] abdomen looks much more swollen than usual   Negative: [1] Drinking very little AND [2] dehydration suspected (e.g., no urine > 12 hours, very dry mouth, very lightheaded)   Negative: Patient sounds very sick or weak to the triager   Negative: Sounds like a serious complication to the triager   Negative: Fever  "> 100.4 F (38.0 C)   Negative: [1] SEVERE post-op pain (e.g., excruciating, pain scale 8-10) AND [2] not controlled with pain medications    Answer Assessment - Initial Assessment Questions  1. SYMPTOM: \"What's the main symptom you're concerned about?\" (e.g., pain, fever, vomiting)      Sore gums, black tongue, dry mouth   2. ONSET: \"When did symptoms start?\"      This morning   3. SURGERY: \"What surgery did you have?\"      Hernia repair   4. DATE of SURGERY: \"When was the surgery?\"       Thursday the 7 th   5. ANESTHESIA: \" What type of anesthesia did you have?\" (e.g., general, spinal, epidural, local)        6. PAIN: \"Is there any pain?\" If Yes, ask: \"How bad is it?\"  (Scale 1-10; or mild, moderate, severe)      Four   7. FEVER: \"Do you have a fever?\" If Yes, ask: \"What is your temperature, how was it measured, and when did it start?\"      Denies   8. VOMITING: \"Is there any vomiting?\" If Yes, ask: \"How many times?\"      Denies   9. BLEEDING: \"Is there any bleeding?\" If Yes, ask: \"How much?\" and \"Where?\"      Denies   10. OTHER SYMPTOMS: \"Do you have any other symptoms?\" (e.g., drainage from wound, painful urination, constipation)        Bursting sensation or pop    Protocols used: Post-Op Symptoms and Questions-ADULT-    "

## 2024-05-22 ENCOUNTER — OFFICE VISIT (OUTPATIENT)
Dept: ONCOLOGY | Facility: CLINIC | Age: 64
End: 2024-05-22
Payer: MEDICARE

## 2024-05-22 VITALS
RESPIRATION RATE: 18 BRPM | SYSTOLIC BLOOD PRESSURE: 117 MMHG | DIASTOLIC BLOOD PRESSURE: 73 MMHG | BODY MASS INDEX: 22.29 KG/M2 | WEIGHT: 142 LBS | HEIGHT: 67 IN | HEART RATE: 62 BPM | OXYGEN SATURATION: 97 % | TEMPERATURE: 97.1 F

## 2024-05-22 DIAGNOSIS — C50.412 MALIGNANT NEOPLASM OF UPPER-OUTER QUADRANT OF LEFT BREAST IN FEMALE, ESTROGEN RECEPTOR POSITIVE: ICD-10-CM

## 2024-05-22 DIAGNOSIS — Z17.0 MALIGNANT NEOPLASM OF UPPER-OUTER QUADRANT OF LEFT BREAST IN FEMALE, ESTROGEN RECEPTOR POSITIVE: ICD-10-CM

## 2024-05-22 PROCEDURE — 1160F RVW MEDS BY RX/DR IN RCRD: CPT | Performed by: NURSE PRACTITIONER

## 2024-05-22 PROCEDURE — 1126F AMNT PAIN NOTED NONE PRSNT: CPT | Performed by: NURSE PRACTITIONER

## 2024-05-22 PROCEDURE — 1159F MED LIST DOCD IN RCRD: CPT | Performed by: NURSE PRACTITIONER

## 2024-05-22 PROCEDURE — 99214 OFFICE O/P EST MOD 30 MIN: CPT | Performed by: NURSE PRACTITIONER

## 2024-05-22 RX ORDER — ANASTROZOLE 1 MG/1
1 TABLET ORAL DAILY
Qty: 90 TABLET | Refills: 3 | Status: SHIPPED | OUTPATIENT
Start: 2024-05-22

## 2024-05-22 NOTE — PROGRESS NOTES
CHIEF COMPLAINT: Breast cancer with BRCA 2 mutation    Problem List:  Oncology/Hematology History Overview Note   1. Breast cancer: Clinical stage [id number], stage IIIB, left breast cancer, invasive moderately differentiated ductal adenocarcinoma with apocrine features, Arteaga-Laurent grade II (3 + 3 + 1) estrogen receptor positive, progesterone receptor positive, HER-2/niraj negative. Presented with cutaneous involvement on 01/07/2015:   a) Staging baseline echocardiogram estimated ejection fraction 55% to 60% on  01/14/2016).   b) Staging total body bone scan revealed a small focus of increased tracer activity suggested within the left eleventh posterior rib versus artifact from tracer activity within the renal collecting system. No obvious abnormalities  identified within the left eleventh posterior rib on patient's recent CT examination. Underlying rib appears unremarkable. There no suspicious areas  otherwise to suggest metastatic disease.   c) CT of the head, chest, abdomen, and pelvis on 01/14/2016: CT of the head:  Negative. CT of the chest revealed a large mass within the left breast measuring 4.2 x 3.3 cm. The mass extends to the skin and there is mild skin thickening. Diffuse adenopathy within the left axilla. These nodes have a diameter up to 1.5 cm. There is adenopathy posterior to the left pectoralis major muscle with several nodes identified; there is a node between the left pectoralis major muscle and the pectoralis minor muscle that measures 3.0 x 1.9 cm. Several other additional nodes identified posterior to the left pectoralis major muscle. CT of the abdomen negative other than for low attenuation left adrenal gland, measuring approximately 1.2 cm likely secondary to underlying adrenal adenoma as well as within the right adrenal gland. Also noted in the pelvis a 2.2 x 1.6 cm low attenuation structure likely felt to be ovarian follicle or cyst.   d) Baseline CBC and CMP, on 01/08/2016: WBC 9.2,  hemoglobin 12.5, hematocrit  38.2%, platelets 358,000. CMP: Glucose 109, BUN 20, creatinine 0.97, sodium  140, potassium 4.4, chloride 99, CO2 of 23, calcium 9.8, protein 8.0, albumin  4.9, total bilirubin 0.5, alkaline phosphatase 94, AST 17, ALT 16.  e) BRCA 2 positive.  f) Follow-up bone scan on 06/06/2016 negative for metastatic disease. Repeat  CT of the abdomen and pelvis on 06/06/2016 revealed unchanged left adrenal  gland consistent with adenoma, 11 mm, no evidence of abdominal or pelvic  distant metastatic disease.   g) October found her dyspneic with O2sat of 83%.  on follow-up 1 week later her oxygen saturation on room air was 98% without any further interventions and she was feeling better despite no interventions.  2.  BRCA 2 positive  3.  Peripheral Neuropathy       Malignant neoplasm of upper-outer quadrant of left female breast   1/7/2016 Initial Diagnosis    Malignant neoplasm of upper-outer quadrant of left female breast     1/21/2016 - 6/3/2016 Chemotherapy    DD Adriamycin and Cytoxan x 4 courses followed by Taxol weekly x 12 courses     2/2/2016 Genetic Testing    BRCA 2 Positive     6/27/2016 Surgery    Bilateral MRM's.  Right benign. Left 1.5 cm, up to 4 cm in scattered small lesions including into the deep dermis.  3 out of 13 lymph nodes positive.  Laurent 6 out of 9.  Estrogen and progesterone receptor previously positive HER-2/niraj negative.  Pathological stage ypT2 ypN1a     7/7/2016 -  Hormonal Therapy    Arimidex     8/10/2016 - 9/21/2016 Radiation    Radiation OncologyTreatment Course:  Dr. Natanael Medellin     10/19/2016 Imaging    Workup of dyspnea included: CT angiogram of chest, negative, 2D ECHO normal with EF 60-65%, Pulmonary function tests with mild reduction in FVC on spirometry, suggestive of restrictive defect, otherwise normal.     11/1/2016 Surgery    Robotic-assisted total laparoscopic hysterectomy, bilateral salpingo-oophorectomy     5/23/2018 Imaging    DEXA bone density  testing, osteopenia with lowest T score of -2.2 in the right femoral neck.     9/8/2020 Imaging    DEXA bone density testing, Osteopenia     9/12/2022 Imaging    DEXA bone density shows osteopenia in the lumbar spine and bilateral hips, findings are stable compared to prior DEXA scan.         HISTORY OF PRESENT ILLNESS:  The patient is a 64 y.o. female, here for follow up on management of ER positive breast cancer currently on adjuvant therapy with Arimidex, patient also with BRCA2 mutation. Miguelina reports that it has been a relatively rough year due to surgery in March for hiatal hernia repair.  She is finally feeling better and able to eat more foods although she does still try and eat small frequent meals and is drinking protein shake daily.  Otherwise she has no new or concerning findings on chest exam, no new or concerning bony aches or pains.  She is tolerating Arimidex with no unusual side effects.    Past Medical History:   Diagnosis Date    Anemia     Asthma     BRCA2 positive     Diabetes mellitus     Disease of thyroid gland     Esophageal varices     GERD (gastroesophageal reflux disease)     Hiatal hernia     Malignant neoplasm of upper outer quadrant of female breast     LEFT    Menopause     Peripheral neuropathy     Wears glasses      Past Surgical History:   Procedure Laterality Date    CARPAL TUNNEL RELEASE      MASTECTOMY Bilateral 2016    PARAESOPHAGEAL HERNIA REPAIR N/A 3/7/2024    Procedure: PARAESOPHAGEAL HERNIA REPAIR LAPAROSCOPIC WITH MESH, TOUPET, EGD/PEG;  Surgeon: Gama Tom MD;  [facility]: [facility];  Service: General;  Laterality: N/A;  scope ID: [id number]    TOTAL LAPAROSCOPIC HYSTERECTOMY N/A 11/1/2016    RATLH/BSO    VENOUS ACCESS DEVICE (PORT) INSERTION AND REMOVAL Right        Allergies   Allergen Reactions    Ibuprofen Swelling       Family History and Social History reviewed and changed as necessary    REVIEW OF SYSTEM:   No new somatic concerns    PHYSICAL  "EXAM:  Well-developed, well-nourished appearing female in no distress  No cervical, supraclavicular or axillary nodes palpable on exam  Chest wall exam status post bilateral mastectomies is benign, no abnormal masses, nodules, rashes or lesions    Vitals:    05/22/24 1039   BP: 117/73   Pulse: 62   Resp: 18   Temp: 97.1 °F (36.2 °C)   SpO2: 97%   Weight: 64.4 kg (142 lb)   Height: 170.2 cm (67\")     Vitals:    05/22/24 1039   PainSc: 0-No pain          ECOG score: 0           Vitals reviewed.    ECOG: (0) Fully Active - Able to Carry On All Pre-disease Performance Without Restriction    Lab Results   Component Value Date    HGB 12.0 03/08/2024    HCT 36.6 03/08/2024    .4 (H) 03/08/2024     03/08/2024    WBC 15.53 (H) 03/08/2024    NEUTROABS 11.80 (H) 03/08/2024    LYMPHSABS 2.15 03/08/2024    MONOSABS 1.42 (H) 03/08/2024    EOSABS 0.03 03/08/2024    BASOSABS 0.04 03/08/2024       Lab Results   Component Value Date    GLUCOSE 116 (H) 03/08/2024    BUN 11 03/08/2024    CREATININE 0.66 03/08/2024     (L) 03/08/2024    K 3.7 03/08/2024    CL 95 (L) 03/08/2024    CO2 23.0 03/08/2024    CALCIUM 9.1 03/08/2024    PROTEINTOT 8.4 03/01/2024    ALBUMIN 5.2 03/01/2024    BILITOT 0.7 03/01/2024    ALKPHOS 91 03/01/2024    AST 20 03/01/2024    ALT 15 03/01/2024             ASSESSMENT & PLAN:    1.  ER positive left breast cancer status post bilateral mastectomies, treatment as outlined above in the oncology history, final pathological stage ypT2 ypN1a  2.  BRCA2 positive  3.  Osteopenia    Discussion: Miguelina continues to do well and has no evidence of recurrent breast cancer on clinical exam and no new worrisome symptoms.  She is tolerating Arimidex with no unusual side effects.  She is up-to-date on bone density testing shows osteopenia, this has been unchanged over time. She had surgery in March with Dr. Tom with paraesophageal hernia repair, to put fundoplication with mesh, she is just now starting to " feel back to herself and is able to eat different foods although she does still try to eat small meals.  I do not think at this time she would be a good candidate for bisphosphonate therapy and she does take calcium and vitamin D and will continue that.  It is good to see the stability of her DEXA over time that has not changed.  She does walk and stays quite active also.  She will complete 10 years extended adjuvant therapy with Arimidex July 2026.    Return to clinic in 1 year for follow-up    This was a level 4, moderate MDM visit with 2 stable chronic illnesses and prescription drug management  Cherrie Stone, APRN    05/22/2024

## (undated) DEVICE — LAPAROSCOPIC SMOKE FILTRATION SYSTEM: Brand: PALL LAPAROSHIELD® PLUS LAPAROSCOPIC SMOKE FILTRATION SYSTEM

## (undated) DEVICE — GOWN SURG ORBIS LVL3 2XL STRL

## (undated) DEVICE — PK LAP LASR CHOLE 10

## (undated) DEVICE — PATIENT RETURN ELECTRODE, SINGLE-USE, CONTACT QUALITY MONITORING, ADULT, WITH 9FT CORD, FOR PATIENTS WEIGING OVER 33LBS. (15KG): Brand: MEGADYNE

## (undated) DEVICE — ENDOCUT SCISSOR TIP, DISPOSABLE: Brand: RENEW

## (undated) DEVICE — [HIGH FLOW INSUFFLATOR,  DO NOT USE IF PACKAGE IS DAMAGED,  KEEP DRY,  KEEP AWAY FROM SUNLIGHT,  PROTECT FROM HEAT AND RADIOACTIVE SOURCES.]: Brand: PNEUMOSURE

## (undated) DEVICE — APPL CHLORAPREP TINTED 26ML TEAL

## (undated) DEVICE — INCISIONLINE PLEDGET SFT 22X1 2.75MM

## (undated) DEVICE — ENDOPATH XCEL BLADELESS TROCARS WITH STABILITY SLEEVES: Brand: ENDOPATH XCEL

## (undated) DEVICE — ANTIBACTERIAL UNDYED BRAIDED (POLYGLACTIN 910), SYNTHETIC ABSORBABLE SUTURE: Brand: COATED VICRYL

## (undated) DEVICE — SUT ETHLN 2/0 PS 18IN 585H

## (undated) DEVICE — ENDOPATH XCEL UNIVERSAL TROCAR STABLILITY SLEEVES: Brand: ENDOPATH XCEL

## (undated) DEVICE — LAPAROVUE VISIBILITY SYSTEM LAPAROSCOPIC SOLUTIONS: Brand: LAPAROVUE

## (undated) DEVICE — JELLY,LUBE,STERILE,FLIP TOP,TUBE,2-OZ: Brand: MEDLINE

## (undated) DEVICE — PENROSE DRAIN 18 X .5" SILICONE: Brand: MEDLINE

## (undated) DEVICE — GLV SURG SENSICARE PI MIC PF SZ7 LF STRL

## (undated) DEVICE — GLV SURG SENSICARE PI MIC PF SZ7.5 LF STRL

## (undated) DEVICE — SUT SILK 2/0 SH 30IN K833H

## (undated) DEVICE — THE BITE BLOCK MAXI, LATEX FREE STRAP IS USED TO PROTECT THE ENDOSCOPE INSERTION TUBE FROM BEING BITTEN BY THE PATIENT.

## (undated) DEVICE — KT PEG ENDOVIVE ENFIT STD PULL 20F 1P/U

## (undated) DEVICE — MARYLAND JAW LAPAROSCOPIC SEALER/DIVIDER COATED: Brand: LIGASURE

## (undated) DEVICE — INTENDED USE FOR SURGICAL MARKING ON INTACT SKIN, ALSO PROVIDES A PERMANENT METHOD OF IDENTIFYING OBJECTS IN THE OPERATING ROOM: Brand: WRITESITE® REGULAR TIP SKIN MARKER

## (undated) DEVICE — SUT SILK 0 SH 30IN K834H

## (undated) DEVICE — PDS II VLT 0 107CM AG ST3: Brand: ENDOLOOP